# Patient Record
Sex: FEMALE | Race: WHITE | NOT HISPANIC OR LATINO | Employment: FULL TIME | URBAN - METROPOLITAN AREA
[De-identification: names, ages, dates, MRNs, and addresses within clinical notes are randomized per-mention and may not be internally consistent; named-entity substitution may affect disease eponyms.]

---

## 2017-01-12 ENCOUNTER — OFFICE VISIT (OUTPATIENT)
Dept: OBGYN | Facility: CLINIC | Age: 47
End: 2017-01-12
Payer: COMMERCIAL

## 2017-01-12 VITALS — DIASTOLIC BLOOD PRESSURE: 77 MMHG | HEART RATE: 91 BPM | SYSTOLIC BLOOD PRESSURE: 126 MMHG

## 2017-01-12 DIAGNOSIS — N92.6 IRREGULAR MENSES: Primary | ICD-10-CM

## 2017-01-12 LAB — BETA HCG QUAL IFA URINE: NEGATIVE

## 2017-01-12 PROCEDURE — 58100 BIOPSY OF UTERUS LINING: CPT | Performed by: OBSTETRICS & GYNECOLOGY

## 2017-01-12 PROCEDURE — 84703 CHORIONIC GONADOTROPIN ASSAY: CPT | Performed by: OBSTETRICS & GYNECOLOGY

## 2017-01-12 PROCEDURE — 88305 TISSUE EXAM BY PATHOLOGIST: CPT | Performed by: OBSTETRICS & GYNECOLOGY

## 2017-01-12 NOTE — Clinical Note
GHAZALA--she has an appt with you next week, wants hysterectomy, would like minimally invasive if possible.  I did endo biopsy today, has had ultrasound.  Thanks.   Julia

## 2017-01-12 NOTE — NURSING NOTE
"Chief Complaint   Patient presents with     Other     emb       Initial LMP 2016 Estimated body mass index is 34.37 kg/(m^2) as calculated from the following:    Height as of 10/10/16: 5' 8\" (1.727 m).    Weight as of 16: 226 lb (102.513 kg).  BP completed using cuff size: regular        The following HM Due: NONE      The following patient reported/Care Every where data was sent to:  P ABSTRACT QUALITY INITIATIVES [[id number]]  none     n/a               "

## 2017-01-12 NOTE — PROGRESS NOTES
Coco Wu is a 46 year old  who presents for endometrial biopsy.  See prior GYN note, ultrasound.  She has taken valium/percocet/ibuprofent.     OBJECTIVE:  /77 mmHg  Pulse 91  LMP 2016     Informed consent was obtained for endometrial biopsy.  Using standard technique and hibiclens prep of the cervix and vagina, pipelle aspiration of the endometrium was obtained.  The cervix was grasped anteriorly with a tenaculum.  The uterus sounded to 8 cm.  Tissue was obtained, placed in a container with formalin, labeled, and submitted to pathology.    The patient tolerated the procedure well.  Blood loss was less than one cc.  Standard post-biopsy precautions were given.    ASSESSMENT:  Menorrhagia.  Fibroids.  Desire for definitive treatment.      PLAN:  Call for biopsy results in 1 week.  She has an appt next week with Dr. Wolf to discuss hysterectomy, possibility of laparoscopic vs vaginal.

## 2017-01-16 LAB — COPATH REPORT: NORMAL

## 2017-01-18 ENCOUNTER — OFFICE VISIT (OUTPATIENT)
Dept: OBGYN | Facility: CLINIC | Age: 47
End: 2017-01-18
Payer: COMMERCIAL

## 2017-01-18 VITALS
BODY MASS INDEX: 33.63 KG/M2 | WEIGHT: 221.9 LBS | TEMPERATURE: 97.3 F | DIASTOLIC BLOOD PRESSURE: 73 MMHG | HEART RATE: 73 BPM | SYSTOLIC BLOOD PRESSURE: 118 MMHG | HEIGHT: 68 IN

## 2017-01-18 DIAGNOSIS — N92.0 EXCESSIVE OR FREQUENT MENSTRUATION: ICD-10-CM

## 2017-01-18 DIAGNOSIS — D25.1 INTRAMURAL LEIOMYOMA OF UTERUS: Primary | ICD-10-CM

## 2017-01-18 PROCEDURE — 99214 OFFICE O/P EST MOD 30 MIN: CPT | Performed by: OBSTETRICS & GYNECOLOGY

## 2017-01-18 NOTE — PROGRESS NOTES
"S; Coco Wu is a 46 year old  who is here to discuss hysterectomy.  She has seen Dr. Mckeon for her symptomatic fibroids and menorrhagia and was referred to me because she desires laparoscopic hysterectomy if appropriate.  She also desires to keep her cervix if appropriate.  She is otherwise without complaints.  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    O: /73 mmHg  Pulse 73  Temp(Src) 97.3  F (36.3  C) (Oral)  Ht 5' 8\" (1.727 m)  Wt 221 lb 14.4 oz (100.653 kg)  BMI 33.75 kg/m2  LMP 2017 (Exact Date)    Gen: NAD    No further exam done    A/P: menorrhagia and uterine fibroids   We had a lengthy discussion about hysterectomy options including vaginal vs laparoscopic.  We also reviewed the pros and cons of removing the cervix.  She feels fairly strongly that unless there is a compelling reason to take the cervix, she prefers it is left in.  I explained she will need continued pap smears and has the small possibility of light bleeding during menses if endometrial cells are left behind, and she is ok with that.  We discussed mocellation of the uterus and risk of potential spread of sarcoma if it were to be present.  We discussed the very low risk of sarcoma in general and she is ok with that risk as well.  So, we will plan for a single port laparoscopic supracervical hyst.  We will plan to place in bag and hand morcellate.  We also discussed plan to leave ovaries, unless safety issues arise, and remove the fallopian tubes.   We discussed the risk of surgery including, but not limited to, risk of anesthesia, bleeding, infection, injury to abdominal or pelvic organs, and risk of injury requiring second surgery. She gave verbal consent and all questions were answered.    AMERICA RICHARDSON MD    This visit lasted approximately 25 minutes and " >50% of the time was spent on counseling about hysterectomy.

## 2017-01-18 NOTE — Clinical Note
Surgeon: Armida Wolf Assistant: yes Michelle Evans MD or Lyn Mckinney MD (avani is ok too if necessary) Procedure: single port laparoscopic supracervical hysterectomy Diagnosis: uterine fibroids Length of surgery: 3hr Morning admit: No Day/Time Preference: asap Anesthesia: General Pre-op: With Primary Latex Allergy: No Want pre op labs done: Yes

## 2017-01-18 NOTE — NURSING NOTE
"Chief Complaint   Patient presents with     Consult     hysterectomy consult       Initial /73 mmHg  Pulse 73  Temp(Src) 97.3  F (36.3  C) (Oral)  Ht 5' 8\" (1.727 m)  Wt 221 lb 14.4 oz (100.653 kg)  BMI 33.75 kg/m2  LMP 2017 (Exact Date) Estimated body mass index is 33.75 kg/(m^2) as calculated from the following:    Height as of this encounter: 5' 8\" (1.727 m).    Weight as of this encounter: 221 lb 14.4 oz (100.653 kg).  BP completed using cuff size: regular        The following HM Due: NONE      The following patient reported/Care Every where data was sent to:  P ABSTRACT QUALITY INITIATIVES [[id number]]       patient has appointment for today  Mary Noguera                 "

## 2017-01-20 ENCOUNTER — MYC MEDICAL ADVICE (OUTPATIENT)
Dept: FAMILY MEDICINE | Facility: CLINIC | Age: 47
End: 2017-01-20

## 2017-01-23 ENCOUNTER — OFFICE VISIT (OUTPATIENT)
Dept: FAMILY MEDICINE | Facility: CLINIC | Age: 47
End: 2017-01-23
Payer: COMMERCIAL

## 2017-01-23 VITALS
RESPIRATION RATE: 16 BRPM | TEMPERATURE: 97.7 F | BODY MASS INDEX: 33.46 KG/M2 | OXYGEN SATURATION: 99 % | SYSTOLIC BLOOD PRESSURE: 104 MMHG | WEIGHT: 220 LBS | HEART RATE: 72 BPM | DIASTOLIC BLOOD PRESSURE: 70 MMHG

## 2017-01-23 DIAGNOSIS — N92.1 MENOMETRORRHAGIA: ICD-10-CM

## 2017-01-23 DIAGNOSIS — Z01.818 PREOP GENERAL PHYSICAL EXAM: Primary | ICD-10-CM

## 2017-01-23 DIAGNOSIS — D25.1 INTRAMURAL LEIOMYOMA OF UTERUS: ICD-10-CM

## 2017-01-23 DIAGNOSIS — K21.9 GASTROESOPHAGEAL REFLUX DISEASE WITHOUT ESOPHAGITIS: ICD-10-CM

## 2017-01-23 LAB — HGB BLD-MCNC: 13 G/DL (ref 11.7–15.7)

## 2017-01-23 PROCEDURE — 85018 HEMOGLOBIN: CPT | Performed by: FAMILY MEDICINE

## 2017-01-23 PROCEDURE — 99214 OFFICE O/P EST MOD 30 MIN: CPT | Performed by: FAMILY MEDICINE

## 2017-01-23 PROCEDURE — 36415 COLL VENOUS BLD VENIPUNCTURE: CPT | Performed by: FAMILY MEDICINE

## 2017-01-23 RX ORDER — LORATADINE 10 MG/1
10 TABLET ORAL DAILY
COMMUNITY
Start: 2017-01-23 | End: 2021-03-02

## 2017-01-23 RX ORDER — PANAX, AMERICAN GINSG/B12/ROYL 150-25-25
1 CAPSULE ORAL DAILY
COMMUNITY
Start: 2017-01-23

## 2017-01-23 NOTE — NURSING NOTE
"Chief Complaint   Patient presents with     Pre-Op Exam       Initial /70 mmHg  Pulse 72  Temp(Src) 97.7  F (36.5  C) (Oral)  Resp 16  Wt 220 lb (99.791 kg)  SpO2 99%  LMP 01/16/2017 (Exact Date) Estimated body mass index is 33.46 kg/(m^2) as calculated from the following:    Height as of 1/18/17: 5' 8\" (1.727 m).    Weight as of this encounter: 220 lb (99.791 kg).  BP completed using cuff size: travis Dejesus CMA      "

## 2017-01-23 NOTE — PROGRESS NOTES
[facility] [id number] [place] [id number] [phone]  Dept: [phone]    PRE-OP EVALUATION:  Today's date: 2017    Coco Wu (: 1970) presents for pre-operative evaluation assessment as requested by Armida Lopez MD, Assisting / Michelle Evans.  She requires evaluation and anesthesia risk assessment prior to undergoing surgery/procedure for treatment of menorrhagia/uterine fibroids.  Proposed procedure: Laparoscopic Single Port Supracervical Hysterectomy, Bilateral Laparoscopic Salpingectomy    Date of Surgery/ Procedure: 2017  Time of Surgery/ Procedure: 7:30 AM  Hospital/Surgical Facility: [facility]    Primary Physician: Poonam Mix  Type of Anesthesia Anticipated: General    Patient has a Health Care Directive or Living Will:  NO    Preop Questions 2017   1.  Do you have a history of heart attack, stroke, stent, bypass or surgery on an artery in the head, neck, heart or legs? No   2.  Do you ever have any pain or discomfort in your chest? No   3.  Do you have a history of  Heart Failure? No   4.   Are you troubled by shortness of breath when:  walking on a level surface, or up a slight hill, or at night? No   5.  Do you currently have a cold, bronchitis or other respiratory infection? No   6.  Do you have a cough, shortness of breath, or wheezing? No   7.  Do you sometimes get pains in the calves of your legs when you walk? No   8. Do you or anyone in your family have previous history of blood clots? No   9.  Do you or does anyone in your family have a serious bleeding problem such as prolonged bleeding following surgeries or cuts? No   10. Have you ever had problems with anemia or been told to take iron pills? No   11. Have you had any abnormal blood loss such as black, tarry or bloody stools, or abnormal vaginal bleeding? YES - self   12. Have you ever had a blood transfusion? No   13. Have you or any of your relatives  ever had problems with anesthesia? No   14. Do you have sleep apnea, excessive snoring or daytime drowsiness? No   15. Do you have any prosthetic heart valves? No   16. Do you have prosthetic joints? No   17. Is there any chance that you may be pregnant? No           HPI:                                                      Brief HPI related to upcoming procedure: increasingly irregular and heavy periods.  US showed fibroids, one abutting the endometrium.        See problem list for active medical problems.  Problems all longstanding and stable, except as noted/documented.  See ROS for pertinent symptoms related to these conditions.          GERD Follow-Up:  Well-controlled with diet.      MEDICAL HISTORY:                                                      Patient Active Problem List    Diagnosis Date Noted     Excessive or frequent menstruation 01/18/2017     Priority: Medium     Intramural leiomyoma of uterus 01/18/2017     Priority: Medium     Gastroesophageal reflux disease without esophagitis 09/10/2015     Priority: Medium     Allergic rhinitis due to allergen      Priority: Medium      Past Medical History   Diagnosis Date     Other nephritis and nephropathy, not specified as acute or chronic, with specified pathological lesion in kidney age 6 years     unsure - recalls kidney biopsy     Obesity      resolved - lost 72#     Allergic rhinitis due to allergen      ASCUS favor benign 2010     neg HPV  Plan cotest in 3 yrs     Past Surgical History   Procedure Laterality Date     Phlebectomy multiple stab  8/1/2012     Procedure: PHLEBECTOMY MULTIPLE STAB;  Left phlebectomy;  Surgeon: Lyndon Tenorio MD;  Location: [facility] OR     Current Outpatient Prescriptions   Medication Sig Dispense Refill     Ginsengs-Royal Jelly-Vit B12 (GINSENG ROYAL JELLY PLUS) 375-1-15 MG-MG-MCG CAPS Take 1 capsule by mouth daily       magnesium oxide (MAG-OX) 400 (241.3 MG) MG tablet Take 1 tablet (400 mg) by mouth daily        loratadine (CLARITIN) 10 MG tablet Take 1 tablet (10 mg) by mouth daily       OTC products: NSAIDS prn - has plan to stop 10 days prior to surgery    Allergies   Allergen Reactions     Bactrim      Pt had rash all over her body.     Tylenol W/Codeine [Acetaminophen-Codeine] Nausea      Latex Allergy: NO    Social History   Substance Use Topics     Smoking status: Never Smoker      Smokeless tobacco: Never Used     Alcohol Use: Yes      Comment: 3 glasses wine a week     History   Drug Use No       REVIEW OF SYSTEMS:                                                    CONST: NEGATIVE for fevers/chills/sweats, unexplained weight loss/gain, and POSITIVE for fatigue  EYES: NEGATIVE for change in vision  ENT: NEGATIVE for difficulty hearing/tinnitus, and problems with teeth/gums  BREAST: NEGATIVE for breast lump/discharge  CV: NEGATIVE for chest pain/discomfort, leg pain with exercise, and palpitations  RESP: NEGATIVE for cough/wheeze, and difficulty breathing  GI: NEGATIVE for abdominal pain, blood in bowel movement, and nausea/vomiting/diarrhea  : NEGATIVE for nighttime urination, leaking urine, vaginal discharge, and concerns about sexual function  MS: POSITIVE for chronic hip pain  SKIN: NEGATIVE for rash or mole change  NEURO: NEGATIVE for headache, dizziness/lightheadedness, numbness, memory loss, and loss of coordination  PSYCH: NEGATIVE for anxiety/stress, problems with sleep, and depression  HEME: NEGATIVE for unexplained lumps, and easy bruising/bleeding  ENDO: NEGATIVE for excessive thirst or urination  ALL: POSITIVE for allergies     EXAM:                                                    /70 mmHg  Pulse 72  Temp(Src) 97.7  F (36.5  C) (Oral)  Resp 16  Wt 220 lb (99.791 kg)  SpO2 99%  LMP 01/16/2017 (Exact Date)  GEN:  no apparent distress  EYES: PERRL, conjunctivae and sclerae clear   ENT: external ears and nose without lesions or scars, TM's and canals clear bilaterally, oropharynx clear with  moist mucus membranes and normal landmarks and lips, teeth, and gums with no abnormalities noted   NECK:  Supple without adenopathy, mass, or thyromegaly   LUNGS:  normal respiratory effort, and lungs clear to auscultation bilaterally - no rales, rhonchi or wheezes  CV: regular rate and rhythm, normal S1 S2, no S3 or S4, no murmur, click or rub and bilateral lower extremities without edema   ABD:  soft, nontender, no mass, no hepatosplenomegaly, no hernias  SKIN:  normal to inspection and palpation, no rashes or abnormal-appearing lesions   PSYCH: mood/affect appear normal/bright     DIAGNOSTICS:                                                      Labs Resulted Today:   Results for orders placed or performed in visit on 01/23/17   Hemoglobin   Result Value Ref Range    Hemoglobin 13.0 11.7 - 15.7 g/dL       Recent Labs   Lab Test  10/10/16   1100  09/10/15   1118   HGB  12.8  13.8   PLT  241  240   NA   --   139   POTASSIUM   --   3.7   CR   --   0.88        IMPRESSION:                                                    Reason for surgery/procedure: menometrorrhagia with symptomatic fibroids  Diagnosis/reason for consult: preoperative assessment    The proposed surgical procedure is considered INTERMEDIATE risk.    REVISED CARDIAC RISK INDEX  The patient has the following serious cardiovascular risks for perioperative complications such as (MI, PE, VFib and 3  AV Block):  No serious cardiac risks  INTERPRETATION: 0 risks: Class I (very low risk - 0.4% complication rate)    The patient has the following additional risks for perioperative complications:  No identified additional risks      ICD-10-CM    1. Preop general physical exam Z01.818 Hemoglobin   2. Menometrorrhagia N92.1 Hemoglobin   3. Intramural leiomyoma of uterus D25.1    4. Gastroesophageal reflux disease without esophagitis K21.9        RECOMMENDATIONS:                                                        --Stop NSAIDs one week prior to  surgery.  --Patient is to hold all medications/supplements on the day of surgery.    APPROVAL GIVEN to proceed with proposed procedure, without further diagnostic evaluation       Signed Electronically by: Poonam Mix MD    Copy of this evaluation report is provided to requesting physician.    [facility] Preop Guidelines

## 2017-01-23 NOTE — MR AVS SNAPSHOT
After Visit Summary   1/23/2017    Coco Wu    MRN: [id number]           Patient Information     Date Of Birth          1970        Visit Information        Provider Department      1/23/2017 2:00 PM Poonam Mix MD [facility]        Today's Diagnoses     Preop general physical exam    -  1     Excessive or frequent menstruation         Intramural leiomyoma of uterus         Gastroesophageal reflux disease without esophagitis           Care Instructions      Before Your Surgery      Call your surgeon if there is any change in your health. This includes signs of a cold or flu (such as a sore throat, runny nose, cough, rash or fever).    Do not smoke, drink alcohol or take over the counter medicine (unless your surgeon or primary care doctor tells you to) for the 24 hours before and after surgery.    If you take prescribed drugs: Follow your doctor s orders about which medicines to take and which to stop until after surgery.    Eating and drinking prior to surgery: follow the instructions from your surgeon    Take a shower or bath the night before surgery. Use the soap your surgeon gave you to gently clean your skin. If you do not have soap from your surgeon, use your regular soap. Do not shave or scrub the surgery site.  Wear clean pajamas and have clean sheets on your bed.   Before Your Surgery    Call your surgeon if there is any change in your health. This includes signs of a cold or flu (such as a sore throat, runny nose, cough, rash or fever).  Do not smoke, drink alcohol or take over the counter medicine (unless your surgeon or primary care doctor tells you to) for the 24 hours before and after surgery.  If you take prescribed drugs: Follow your doctor s orders about which medicines to take and which to stop until after surgery.  Eating and drinking prior to surgery: follow the instructions from your surgeon  Take a shower or bath the night before surgery. Use the  soap your surgeon gave you to gently clean your skin. If you do not have soap from your surgeon, use your regular soap. Do not shave or scrub the surgery site.  Wear clean pajamas and have clean sheets on your bed.         Follow-ups after your visit        Your next 10 appointments already scheduled     Feb 15, 2017  4:00 PM   LAB with RD LAB   [facility]    [address] [place] [id number] [phone]           Patient must bring picture ID.  Patient should be prepared to give a urine specimen  Please do not eat 10-12 hours before your appointment if you are coming in fasting for labs on lipids, cholesterol, or glucose (sugar).  Pregnant women should follow their Care Team instructions. Water with medications is okay. Do not drink coffee or other fluids.   If you have concerns about taking  your medications, please ask at office or if scheduling via [employer], send a message by clicking on Secure Messaging, Message Your Care Team.            Feb 17, 2017   Procedure with Armida Wolf MD   [facility], [place], Same Day Surgery (--)    [address] [facility] [address] [phone]              Who to contact     If you have questions or need follow up information about today's clinic visit or your schedule please contact [facility] directly at [phone].  Normal or non-critical lab and imaging results will be communicated to you by MyChart, letter or phone within 4 business days after the clinic has received the results. If you do not hear from us within 7 days, please contact the clinic through MyChart or phone. If you have a critical or abnormal lab result, we will notify you by phone as soon as possible.  Submit refill requests through [employer] or call your pharmacy and they will forward the refill request to us. Please allow 3 business days for your refill to be completed.          Additional Information About Your  Visit        MyChar Information     [employer] gives you secure access to your electronic health record. If you see a primary care provider, you can also send messages to your care team and make appointments. If you have questions, please call your primary care clinic.  If you do not have a primary care provider, please call [phone] and they will assist you.        Care EveryWhere ID     This is your Care EveryWhere ID. This could be used by other organizations to access your [facility] medical records  [id number]        Your Vitals Were     Pulse Temperature Respirations Pulse Oximetry Last Period       72 97.7  F (36.5  C) (Oral) 16 99% 01/16/2017 (Exact Date)        Blood Pressure from Last 3 Encounters:   01/23/17 104/70   01/18/17 118/73   01/12/17 126/77    Weight from Last 3 Encounters:   01/23/17 220 lb (99.791 kg)   01/18/17 221 lb 14.4 oz (100.653 kg)   12/20/16 226 lb (102.513 kg)              We Performed the Following     Hemoglobin        Primary Care Provider Office Phone # Fax #    Poonam Mix [phone]       [facility] [address]  [facility] [address]        Thank you!     Thank you for choosing [facility]  for your care. Our goal is always to provide you with excellent care. Hearing back from our patients is one way we can continue to improve our services. Please take a few minutes to complete the written survey that you may receive in the mail after your visit with us. Thank you!             Your Updated Medication List - Protect others around you: Learn how to safely use, store and throw away your medicines at www.disposemymeds.org.          This list is accurate as of: 1/23/17  2:30 PM.  Always use your most recent med list.                   Brand Name Dispense Instructions for use    CLARITIN 10 MG tablet   Generic drug:  loratadine      Take 1 tablet (10 mg) by mouth daily       GINSENG ROYAL JELLY PLUS 375-1-15 MG-MG-MCG Caps       Take 1 capsule by mouth daily       magnesium oxide 400 (241.3 MG) MG tablet    MAG-OX     Take 1 tablet (400 mg) by mouth daily

## 2017-01-23 NOTE — PROGRESS NOTES
"[facility] [place]  [address] [place] [id number] [phone]  Dept: [phone]    PRE-OP EVALUATION:  Today's date: 2017    Coco Wu (: 1970) presents for pre-operative evaluation assessment as requested by                    .  She requires evaluation and anesthesia risk assessment prior to undergoing surgery/procedure for treatment of Laparoscopic Single Port Supracervical Hysterectomy, Bilateral Laparoscopic Salpingectomy .  Proposed procedure: Laparoscopic Single Port Supracervical Hysterectomy, Bilateral Laparoscopic Salpingectomy    Date of Surgery/ Procedure: ***  Time of Surgery/ Procedure: ***  Hospital/Surgical Facility: ***  {SURGERY FAX NUMBER:[id number]::\"Fax number for surgical facility: ***\"}  Primary Physician: Poonam Mix  Type of Anesthesia Anticipated: {ANESTHESIA:698428}    Patient has a Health Care Directive or Living Will:  {YES/NO:973528::\"NO\"}    {PREOP QUESTIONNAIRE OPTIONS 2 (by MA):947522}    HPI:                                                      Brief HPI related to upcoming procedure: ***      {Ch. Problems:160837}    MEDICAL HISTORY:                                                      Patient Active Problem List    Diagnosis Date Noted     Excessive or frequent menstruation 2017     Priority: Medium     Intramural leiomyoma of uterus 2017     Priority: Medium     Gastroesophageal reflux disease without esophagitis 09/10/2015     Priority: Medium     Allergic rhinitis due to allergen      Priority: Medium     CARDIOVASCULAR SCREENING; LDL GOAL LESS THAN 160 2010     Priority: Medium      Past Medical History   Diagnosis Date     Other nephritis and nephropathy, not specified as acute or chronic, with specified pathological lesion in kidney age 6 years     unsure - recalls kidney biopsy     Obesity      resolved - lost 72#     Allergic rhinitis due to allergen      ASCUS favor benign      neg HPV  Plan " "cotest in 3 yrs     Past Surgical History   Procedure Laterality Date     Phlebectomy multiple stab  8/1/2012     Procedure: PHLEBECTOMY MULTIPLE STAB;  Left phlebectomy;  Surgeon: Lyndon Tenorio MD;  Location: [facility] OR     Current Outpatient Prescriptions   Medication Sig Dispense Refill     Ginsengs-Royal Jelly-Vit B12 (GINSENG ROYAL JELLY PLUS) 375-1-15 MG-MG-MCG CAPS Take 1 capsule by mouth daily       magnesium oxide (MAG-OX) 400 (241.3 MG) MG tablet Take 1 tablet (400 mg) by mouth daily       loratadine (CLARITIN) 10 MG tablet Take 1 tablet (10 mg) by mouth daily       OTC products: {OTC ANALGESICS:341624}    Allergies   Allergen Reactions     Bactrim      Pt had rash all over her body.     Tylenol W/Codeine [Acetaminophen-Codeine] Nausea      Latex Allergy: {YES/NO WITH DEFAULT:611157::\"NO\"}    Social History   Substance Use Topics     Smoking status: Never Smoker      Smokeless tobacco: Never Used     Alcohol Use: Yes      Comment: once a wk.     History   Drug Use No       REVIEW OF SYSTEMS:                                                    {ROS Preop Choices:404928}    EXAM:                                                    LMP 01/16/2017 (Exact Date)  {EXAM Preop Choices:961363}    DIAGNOSTICS:                                                    {DIAGNOSTIC FOR PREOP:635594}    Recent Labs   Lab Test  10/10/16   1100  09/10/15   1118   HGB  12.8  13.8   PLT  241  240   NA   --   139   POTASSIUM   --   3.7   CR   --   0.88        IMPRESSION:                                                    {PREOP REASONS:572128::\"Reason for surgery/procedure: ***\",\"Diagnosis/reason for consult: ***\"}    The proposed surgical procedure is considered {HIGH=major cardiovascular or procedures requiring prolonged anesthesia >4 hours or large fluid shifts;    INTERMEDIATE=abdominal, most orthopedic and intrathoracic surgery; LOW= endoscopy, cataract and breast surgery:441175} risk.    REVISED CARDIAC RISK INDEX  The " "patient has the following serious cardiovascular risks for perioperative complications such as (MI, PE, VFib and 3  AV Block):  {PREOP REVISED CARDIAC INDEX (RCI):854982:p:\"No serious cardiac risks\"}  INTERPRETATION: {REVISED CARDIAC RISK INTERPRETATION:814716}    The patient has the following additional risks for perioperative complications:  {Additional perioperative risks:535145:p:\"No identified additional risks\"}    No diagnosis found.    RECOMMENDATIONS:                                                      {IMPORTANT - Conditions - complete carefully!!:663647}    {IMPORTANT - Medications:551609::\"--Patient is to take all scheduled medications on the day of surgery EXCEPT for modifications listed below.\"}    {IMPORTANT - Approval:953043:p:\"APPROVAL GIVEN to proceed with proposed procedure, without further diagnostic evaluation\"}       Signed Electronically by: Poonam Mix MD    Copy of this evaluation report is provided to requesting physician.    [facility] Preop Guidelines  "

## 2017-02-10 DIAGNOSIS — Z01.818 PREOPERATIVE EXAMINATION: Primary | ICD-10-CM

## 2017-02-11 ENCOUNTER — ANESTHESIA EVENT (OUTPATIENT)
Dept: SURGERY | Facility: CLINIC | Age: 47
End: 2017-02-11
Payer: COMMERCIAL

## 2017-02-15 DIAGNOSIS — Z01.818 PREOPERATIVE EXAMINATION: ICD-10-CM

## 2017-02-15 LAB
ABO + RH BLD: NORMAL
ABO + RH BLD: NORMAL
BLD GP AB SCN SERPL QL: NORMAL
BLOOD BANK CMNT PATIENT-IMP: NORMAL
ERYTHROCYTE [DISTWIDTH] IN BLOOD BY AUTOMATED COUNT: 14.8 % (ref 10–15)
HCT VFR BLD AUTO: 35.8 % (ref 35–47)
HGB BLD-MCNC: 11.7 G/DL (ref 11.7–15.7)
MCH RBC QN AUTO: 29.5 PG (ref 26.5–33)
MCHC RBC AUTO-ENTMCNC: 32.7 G/DL (ref 31.5–36.5)
MCV RBC AUTO: 90 FL (ref 78–100)
PLATELET # BLD AUTO: 262 10E9/L (ref 150–450)
RBC # BLD AUTO: 3.96 10E12/L (ref 3.8–5.2)
SPECIMEN EXP DATE BLD: NORMAL
WBC # BLD AUTO: 6.9 10E9/L (ref 4–11)

## 2017-02-15 PROCEDURE — 36415 COLL VENOUS BLD VENIPUNCTURE: CPT | Performed by: OBSTETRICS & GYNECOLOGY

## 2017-02-15 PROCEDURE — 86850 RBC ANTIBODY SCREEN: CPT | Performed by: OBSTETRICS & GYNECOLOGY

## 2017-02-15 PROCEDURE — 85027 COMPLETE CBC AUTOMATED: CPT | Performed by: OBSTETRICS & GYNECOLOGY

## 2017-02-15 PROCEDURE — 86900 BLOOD TYPING SEROLOGIC ABO: CPT | Performed by: OBSTETRICS & GYNECOLOGY

## 2017-02-15 PROCEDURE — 86901 BLOOD TYPING SEROLOGIC RH(D): CPT | Performed by: OBSTETRICS & GYNECOLOGY

## 2017-02-15 PROCEDURE — 99207 ZZC NO CHARGE NURSE ONLY: CPT | Performed by: OBSTETRICS & GYNECOLOGY

## 2017-02-17 ENCOUNTER — SURGERY (OUTPATIENT)
Age: 47
End: 2017-02-17

## 2017-02-17 ENCOUNTER — HOSPITAL ENCOUNTER (OUTPATIENT)
Facility: CLINIC | Age: 47
Discharge: HOME | End: 2017-02-17
Attending: OBSTETRICS & GYNECOLOGY | Admitting: OBSTETRICS & GYNECOLOGY
Payer: COMMERCIAL

## 2017-02-17 ENCOUNTER — ANESTHESIA (OUTPATIENT)
Dept: SURGERY | Facility: CLINIC | Age: 47
End: 2017-02-17
Payer: COMMERCIAL

## 2017-02-17 VITALS
BODY MASS INDEX: 32.81 KG/M2 | OXYGEN SATURATION: 97 % | WEIGHT: 216.49 LBS | SYSTOLIC BLOOD PRESSURE: 114 MMHG | HEIGHT: 68 IN | DIASTOLIC BLOOD PRESSURE: 67 MMHG | RESPIRATION RATE: 14 BRPM | TEMPERATURE: 97.7 F

## 2017-02-17 DIAGNOSIS — Z90.710 S/P HYSTERECTOMY: Primary | ICD-10-CM

## 2017-02-17 LAB
HCG UR QL: NEGATIVE
HGB BLD-MCNC: 13.8 G/DL (ref 11.7–15.7)

## 2017-02-17 PROCEDURE — 36000064 ZZH SURGERY LEVEL 4 EA 15 ADDTL MIN - UMMC: Performed by: OBSTETRICS & GYNECOLOGY

## 2017-02-17 PROCEDURE — 81025 URINE PREGNANCY TEST: CPT | Performed by: OBSTETRICS & GYNECOLOGY

## 2017-02-17 PROCEDURE — C1765 ADHESION BARRIER: HCPCS | Performed by: OBSTETRICS & GYNECOLOGY

## 2017-02-17 PROCEDURE — 25000566 ZZH SEVOFLURANE, EA 15 MIN: Performed by: OBSTETRICS & GYNECOLOGY

## 2017-02-17 PROCEDURE — 25000128 H RX IP 250 OP 636: Performed by: NURSE ANESTHETIST, CERTIFIED REGISTERED

## 2017-02-17 PROCEDURE — 85018 HEMOGLOBIN: CPT | Performed by: ANESTHESIOLOGY

## 2017-02-17 PROCEDURE — 71000014 ZZH RECOVERY PHASE 1 LEVEL 2 FIRST HR: Performed by: OBSTETRICS & GYNECOLOGY

## 2017-02-17 PROCEDURE — 27210995 ZZH RX 272: Performed by: OBSTETRICS & GYNECOLOGY

## 2017-02-17 PROCEDURE — 25000125 ZZHC RX 250: Performed by: ANESTHESIOLOGY

## 2017-02-17 PROCEDURE — 58542 LSH W/T/O UT 250 G OR LESS: CPT | Mod: GC | Performed by: OBSTETRICS & GYNECOLOGY

## 2017-02-17 PROCEDURE — 25000128 H RX IP 250 OP 636: Performed by: ANESTHESIOLOGY

## 2017-02-17 PROCEDURE — 88307 TISSUE EXAM BY PATHOLOGIST: CPT | Performed by: OBSTETRICS & GYNECOLOGY

## 2017-02-17 PROCEDURE — 71000027 ZZH RECOVERY PHASE 2 EACH 15 MINS: Performed by: OBSTETRICS & GYNECOLOGY

## 2017-02-17 PROCEDURE — 37000008 ZZH ANESTHESIA TECHNICAL FEE, 1ST 30 MIN: Performed by: OBSTETRICS & GYNECOLOGY

## 2017-02-17 PROCEDURE — 25000125 ZZHC RX 250: Performed by: NURSE ANESTHETIST, CERTIFIED REGISTERED

## 2017-02-17 PROCEDURE — 27210794 ZZH OR GENERAL SUPPLY STERILE: Performed by: OBSTETRICS & GYNECOLOGY

## 2017-02-17 PROCEDURE — 36000062 ZZH SURGERY LEVEL 4 1ST 30 MIN - UMMC: Performed by: OBSTETRICS & GYNECOLOGY

## 2017-02-17 PROCEDURE — C9290 INJ, BUPIVACAINE LIPOSOME: HCPCS | Performed by: ANESTHESIOLOGY

## 2017-02-17 PROCEDURE — 37000009 ZZH ANESTHESIA TECHNICAL FEE, EACH ADDTL 15 MIN: Performed by: OBSTETRICS & GYNECOLOGY

## 2017-02-17 PROCEDURE — 25800025 ZZH RX 258: Performed by: NURSE ANESTHETIST, CERTIFIED REGISTERED

## 2017-02-17 PROCEDURE — 40000171 ZZH STATISTIC PRE-PROCEDURE ASSESSMENT III: Performed by: OBSTETRICS & GYNECOLOGY

## 2017-02-17 PROCEDURE — 71000015 ZZH RECOVERY PHASE 1 LEVEL 2 EA ADDTL HR: Performed by: OBSTETRICS & GYNECOLOGY

## 2017-02-17 PROCEDURE — 25000125 ZZHC RX 250: Performed by: OBSTETRICS & GYNECOLOGY

## 2017-02-17 PROCEDURE — 58542 LSH W/T/O UT 250 G OR LESS: CPT | Mod: 80 | Performed by: OBSTETRICS & GYNECOLOGY

## 2017-02-17 PROCEDURE — 88307 TISSUE EXAM BY PATHOLOGIST: CPT | Mod: 26 | Performed by: OBSTETRICS & GYNECOLOGY

## 2017-02-17 PROCEDURE — 25000128 H RX IP 250 OP 636: Performed by: OBSTETRICS & GYNECOLOGY

## 2017-02-17 PROCEDURE — 27211024 ZZHC OR SUPPLY OTHER OPNP: Performed by: OBSTETRICS & GYNECOLOGY

## 2017-02-17 RX ORDER — ONDANSETRON 4 MG/1
4 TABLET, ORALLY DISINTEGRATING ORAL EVERY 30 MIN PRN
Status: DISCONTINUED | OUTPATIENT
Start: 2017-02-17 | End: 2017-02-17 | Stop reason: HOSPADM

## 2017-02-17 RX ORDER — ONDANSETRON 2 MG/ML
4 INJECTION INTRAMUSCULAR; INTRAVENOUS EVERY 30 MIN PRN
Status: DISCONTINUED | OUTPATIENT
Start: 2017-02-17 | End: 2017-02-17 | Stop reason: HOSPADM

## 2017-02-17 RX ORDER — LIDOCAINE 40 MG/G
CREAM TOPICAL
Status: DISCONTINUED | OUTPATIENT
Start: 2017-02-17 | End: 2017-02-17 | Stop reason: HOSPADM

## 2017-02-17 RX ORDER — NALOXONE HYDROCHLORIDE 0.4 MG/ML
.1-.4 INJECTION, SOLUTION INTRAMUSCULAR; INTRAVENOUS; SUBCUTANEOUS
Status: DISCONTINUED | OUTPATIENT
Start: 2017-02-17 | End: 2017-02-17 | Stop reason: HOSPADM

## 2017-02-17 RX ORDER — SODIUM CHLORIDE, SODIUM LACTATE, POTASSIUM CHLORIDE, CALCIUM CHLORIDE 600; 310; 30; 20 MG/100ML; MG/100ML; MG/100ML; MG/100ML
INJECTION, SOLUTION INTRAVENOUS CONTINUOUS PRN
Status: DISCONTINUED | OUTPATIENT
Start: 2017-02-17 | End: 2017-02-17

## 2017-02-17 RX ORDER — AMOXICILLIN 250 MG
1-2 CAPSULE ORAL 2 TIMES DAILY
Qty: 30 TABLET | Refills: 0 | Status: SHIPPED | OUTPATIENT
Start: 2017-02-17 | End: 2017-03-03

## 2017-02-17 RX ORDER — FLUMAZENIL 0.1 MG/ML
0.2 INJECTION, SOLUTION INTRAVENOUS
Status: DISCONTINUED | OUTPATIENT
Start: 2017-02-17 | End: 2017-02-17 | Stop reason: HOSPADM

## 2017-02-17 RX ORDER — ALBUTEROL SULFATE 0.83 MG/ML
2.5 SOLUTION RESPIRATORY (INHALATION) EVERY 4 HOURS PRN
Status: DISCONTINUED | OUTPATIENT
Start: 2017-02-17 | End: 2017-02-17 | Stop reason: HOSPADM

## 2017-02-17 RX ORDER — KETOROLAC TROMETHAMINE 30 MG/ML
INJECTION, SOLUTION INTRAMUSCULAR; INTRAVENOUS PRN
Status: DISCONTINUED | OUTPATIENT
Start: 2017-02-17 | End: 2017-02-17

## 2017-02-17 RX ORDER — IBUPROFEN 600 MG/1
600 TABLET, FILM COATED ORAL EVERY 6 HOURS PRN
Qty: 30 TABLET | Refills: 0 | Status: SHIPPED | OUTPATIENT
Start: 2017-02-17 | End: 2017-03-03

## 2017-02-17 RX ORDER — KETOROLAC TROMETHAMINE 30 MG/ML
30 INJECTION, SOLUTION INTRAMUSCULAR; INTRAVENOUS EVERY 6 HOURS PRN
Status: DISCONTINUED | OUTPATIENT
Start: 2017-02-17 | End: 2017-02-17 | Stop reason: HOSPADM

## 2017-02-17 RX ORDER — CEFAZOLIN SODIUM 2 G/100ML
2 INJECTION, SOLUTION INTRAVENOUS
Status: COMPLETED | OUTPATIENT
Start: 2017-02-17 | End: 2017-02-17

## 2017-02-17 RX ORDER — DEXAMETHASONE SODIUM PHOSPHATE 4 MG/ML
INJECTION, SOLUTION INTRA-ARTICULAR; INTRALESIONAL; INTRAMUSCULAR; INTRAVENOUS; SOFT TISSUE PRN
Status: DISCONTINUED | OUTPATIENT
Start: 2017-02-17 | End: 2017-02-17

## 2017-02-17 RX ORDER — PROPOFOL 10 MG/ML
INJECTION, EMULSION INTRAVENOUS PRN
Status: DISCONTINUED | OUTPATIENT
Start: 2017-02-17 | End: 2017-02-17

## 2017-02-17 RX ORDER — GLYCOPYRROLATE 0.2 MG/ML
INJECTION, SOLUTION INTRAMUSCULAR; INTRAVENOUS PRN
Status: DISCONTINUED | OUTPATIENT
Start: 2017-02-17 | End: 2017-02-17

## 2017-02-17 RX ORDER — EPHEDRINE SULFATE 50 MG/ML
INJECTION, SOLUTION INTRAMUSCULAR; INTRAVENOUS; SUBCUTANEOUS PRN
Status: DISCONTINUED | OUTPATIENT
Start: 2017-02-17 | End: 2017-02-17

## 2017-02-17 RX ORDER — SODIUM CHLORIDE 9 MG/ML
INJECTION, SOLUTION INTRAVENOUS CONTINUOUS PRN
Status: DISCONTINUED | OUTPATIENT
Start: 2017-02-17 | End: 2017-02-17

## 2017-02-17 RX ORDER — MEPERIDINE HYDROCHLORIDE 25 MG/ML
12.5 INJECTION INTRAMUSCULAR; INTRAVENOUS; SUBCUTANEOUS
Status: DISCONTINUED | OUTPATIENT
Start: 2017-02-17 | End: 2017-02-17 | Stop reason: HOSPADM

## 2017-02-17 RX ORDER — MAGNESIUM HYDROXIDE 1200 MG/15ML
LIQUID ORAL PRN
Status: DISCONTINUED | OUTPATIENT
Start: 2017-02-17 | End: 2017-02-17 | Stop reason: HOSPADM

## 2017-02-17 RX ORDER — HYDROCODONE BITARTRATE AND ACETAMINOPHEN 5; 325 MG/1; MG/1
1-2 TABLET ORAL
Status: DISCONTINUED | OUTPATIENT
Start: 2017-02-17 | End: 2017-02-17 | Stop reason: HOSPADM

## 2017-02-17 RX ORDER — FENTANYL CITRATE 50 UG/ML
25-50 INJECTION, SOLUTION INTRAMUSCULAR; INTRAVENOUS
Status: DISCONTINUED | OUTPATIENT
Start: 2017-02-17 | End: 2017-02-17 | Stop reason: HOSPADM

## 2017-02-17 RX ORDER — HYDROCODONE BITARTRATE AND ACETAMINOPHEN 5; 325 MG/1; MG/1
1-2 TABLET ORAL EVERY 4 HOURS PRN
Qty: 20 TABLET | Refills: 0 | Status: SHIPPED | OUTPATIENT
Start: 2017-02-17 | End: 2017-03-03

## 2017-02-17 RX ORDER — CEFAZOLIN SODIUM 1 G/3ML
1 INJECTION, POWDER, FOR SOLUTION INTRAMUSCULAR; INTRAVENOUS SEE ADMIN INSTRUCTIONS
Status: DISCONTINUED | OUTPATIENT
Start: 2017-02-17 | End: 2017-02-17 | Stop reason: HOSPADM

## 2017-02-17 RX ORDER — FENTANYL CITRATE 50 UG/ML
INJECTION, SOLUTION INTRAMUSCULAR; INTRAVENOUS PRN
Status: DISCONTINUED | OUTPATIENT
Start: 2017-02-17 | End: 2017-02-17

## 2017-02-17 RX ORDER — BUPIVACAINE HYDROCHLORIDE AND EPINEPHRINE 2.5; 5 MG/ML; UG/ML
INJECTION, SOLUTION INFILTRATION; PERINEURAL PRN
Status: DISCONTINUED | OUTPATIENT
Start: 2017-02-17 | End: 2017-02-17

## 2017-02-17 RX ORDER — BUPIVACAINE HYDROCHLORIDE 2.5 MG/ML
INJECTION, SOLUTION INFILTRATION; PERINEURAL PRN
Status: DISCONTINUED | OUTPATIENT
Start: 2017-02-17 | End: 2017-02-17 | Stop reason: HOSPADM

## 2017-02-17 RX ORDER — SODIUM CHLORIDE, SODIUM LACTATE, POTASSIUM CHLORIDE, CALCIUM CHLORIDE 600; 310; 30; 20 MG/100ML; MG/100ML; MG/100ML; MG/100ML
INJECTION, SOLUTION INTRAVENOUS CONTINUOUS
Status: DISCONTINUED | OUTPATIENT
Start: 2017-02-17 | End: 2017-02-17 | Stop reason: HOSPADM

## 2017-02-17 RX ORDER — NEOSTIGMINE METHYLSULFATE 1 MG/ML
VIAL (ML) INJECTION PRN
Status: DISCONTINUED | OUTPATIENT
Start: 2017-02-17 | End: 2017-02-17

## 2017-02-17 RX ORDER — HYDROMORPHONE HYDROCHLORIDE 1 MG/ML
.3-.5 INJECTION, SOLUTION INTRAMUSCULAR; INTRAVENOUS; SUBCUTANEOUS EVERY 10 MIN PRN
Status: DISCONTINUED | OUTPATIENT
Start: 2017-02-17 | End: 2017-02-17 | Stop reason: HOSPADM

## 2017-02-17 RX ORDER — DEXAMETHASONE SODIUM PHOSPHATE 4 MG/ML
4 INJECTION, SOLUTION INTRA-ARTICULAR; INTRALESIONAL; INTRAMUSCULAR; INTRAVENOUS; SOFT TISSUE EVERY 10 MIN PRN
Status: DISCONTINUED | OUTPATIENT
Start: 2017-02-17 | End: 2017-02-17 | Stop reason: HOSPADM

## 2017-02-17 RX ORDER — LIDOCAINE HYDROCHLORIDE 20 MG/ML
INJECTION, SOLUTION INFILTRATION; PERINEURAL PRN
Status: DISCONTINUED | OUTPATIENT
Start: 2017-02-17 | End: 2017-02-17

## 2017-02-17 RX ORDER — ONDANSETRON 2 MG/ML
INJECTION INTRAMUSCULAR; INTRAVENOUS PRN
Status: DISCONTINUED | OUTPATIENT
Start: 2017-02-17 | End: 2017-02-17

## 2017-02-17 RX ADMIN — BUPIVACAINE HYDROCHLORIDE AND EPINEPHRINE BITARTRATE 20 ML: 2.5; .005 INJECTION, SOLUTION INFILTRATION; PERINEURAL at 07:25

## 2017-02-17 RX ADMIN — MIDAZOLAM 1 MG: 1 INJECTION INTRAMUSCULAR; INTRAVENOUS at 07:14

## 2017-02-17 RX ADMIN — LIDOCAINE HYDROCHLORIDE 100 MG: 20 INJECTION, SOLUTION INFILTRATION; PERINEURAL at 07:43

## 2017-02-17 RX ADMIN — Medication 20 MG: at 08:14

## 2017-02-17 RX ADMIN — GLYCOPYRROLATE 0.8 MG: 0.2 INJECTION, SOLUTION INTRAMUSCULAR; INTRAVENOUS at 09:46

## 2017-02-17 RX ADMIN — PROCHLORPERAZINE EDISYLATE 10 MG: 5 INJECTION INTRAMUSCULAR; INTRAVENOUS at 11:16

## 2017-02-17 RX ADMIN — BUPIVACAINE HYDROCHLORIDE 2 ML: 2.5 INJECTION, SOLUTION INFILTRATION; PERINEURAL at 08:39

## 2017-02-17 RX ADMIN — Medication 20 MG: at 09:05

## 2017-02-17 RX ADMIN — DEXAMETHASONE SODIUM PHOSPHATE 4 MG: 4 INJECTION, SOLUTION INTRAMUSCULAR; INTRAVENOUS at 07:55

## 2017-02-17 RX ADMIN — FENTANYL CITRATE 25 MCG: 50 INJECTION, SOLUTION INTRAMUSCULAR; INTRAVENOUS at 11:11

## 2017-02-17 RX ADMIN — FENTANYL CITRATE 100 MCG: 50 INJECTION, SOLUTION INTRAMUSCULAR; INTRAVENOUS at 08:35

## 2017-02-17 RX ADMIN — FENTANYL CITRATE 50 MCG: 50 INJECTION, SOLUTION INTRAMUSCULAR; INTRAVENOUS at 07:38

## 2017-02-17 RX ADMIN — CEFAZOLIN SODIUM 1 G: 2 INJECTION, SOLUTION INTRAVENOUS at 09:34

## 2017-02-17 RX ADMIN — SODIUM CHLORIDE: 9 INJECTION, SOLUTION INTRAVENOUS at 08:25

## 2017-02-17 RX ADMIN — MIDAZOLAM HYDROCHLORIDE 2 MG: 1 INJECTION, SOLUTION INTRAMUSCULAR; INTRAVENOUS at 07:38

## 2017-02-17 RX ADMIN — BUPIVACAINE 20 ML: 13.3 INJECTION, SUSPENSION, LIPOSOMAL INFILTRATION at 07:25

## 2017-02-17 RX ADMIN — FENTANYL CITRATE 100 MCG: 50 INJECTION, SOLUTION INTRAMUSCULAR; INTRAVENOUS at 08:21

## 2017-02-17 RX ADMIN — FENTANYL CITRATE 50 MCG: 50 INJECTION, SOLUTION INTRAMUSCULAR; INTRAVENOUS at 07:14

## 2017-02-17 RX ADMIN — SODIUM CHLORIDE: 9 INJECTION, SOLUTION INTRAVENOUS at 09:54

## 2017-02-17 RX ADMIN — GLYCOPYRROLATE 0.1 MG: 0.2 INJECTION, SOLUTION INTRAMUSCULAR; INTRAVENOUS at 08:02

## 2017-02-17 RX ADMIN — FENTANYL CITRATE 100 MCG: 50 INJECTION, SOLUTION INTRAMUSCULAR; INTRAVENOUS at 08:57

## 2017-02-17 RX ADMIN — NEOSTIGMINE METHYLSULFATE 5 MG: 1 INJECTION INTRAMUSCULAR; INTRAVENOUS; SUBCUTANEOUS at 09:46

## 2017-02-17 RX ADMIN — SODIUM CHLORIDE 300 ML: 900 IRRIGANT IRRIGATION at 09:56

## 2017-02-17 RX ADMIN — PROPOFOL 200 MG: 10 INJECTION, EMULSION INTRAVENOUS at 07:43

## 2017-02-17 RX ADMIN — Medication 40 MG: at 07:43

## 2017-02-17 RX ADMIN — SODIUM CHLORIDE: 9 INJECTION, SOLUTION INTRAVENOUS at 09:44

## 2017-02-17 RX ADMIN — Medication 5 MG: at 09:37

## 2017-02-17 RX ADMIN — FENTANYL CITRATE 100 MCG: 50 INJECTION, SOLUTION INTRAMUSCULAR; INTRAVENOUS at 07:43

## 2017-02-17 RX ADMIN — ONDANSETRON 4 MG: 2 INJECTION INTRAMUSCULAR; INTRAVENOUS at 09:42

## 2017-02-17 RX ADMIN — KETOROLAC TROMETHAMINE 30 MG: 30 INJECTION, SOLUTION INTRAMUSCULAR at 09:55

## 2017-02-17 RX ADMIN — SODIUM CHLORIDE, POTASSIUM CHLORIDE, SODIUM LACTATE AND CALCIUM CHLORIDE: 600; 310; 30; 20 INJECTION, SOLUTION INTRAVENOUS at 07:38

## 2017-02-17 RX ADMIN — Medication 10 MG: at 08:00

## 2017-02-17 RX ADMIN — CEFAZOLIN SODIUM 2 G: 2 INJECTION, SOLUTION INTRAVENOUS at 07:49

## 2017-02-17 NOTE — ANESTHESIA POSTPROCEDURE EVALUATION
Patient: Coco Wu    Procedure(s):  Laparoscopic Single Port Supracervical Hysterectomy, Bilateral Laparoscopic Salpingectomy - Wound Class: II-Clean Contaminated   - Wound Class: II-Clean Contaminated    Diagnosis:Uterine Fibroids   Diagnosis Additional Information: No value filed.    Anesthesia Type:  General, ETT    Note:  Anesthesia Post Evaluation    Patient location during evaluation: PACU  Patient participation: Able to fully participate in evaluation  Level of consciousness: awake  Pain management: adequate  Airway patency: patent  Cardiovascular status: acceptable  Respiratory status: acceptable  Hydration status: balanced  PONV: none     Anesthetic complications: None          Last vitals:  Vitals:    02/17/17 1045 02/17/17 1130 02/17/17 1145   BP:  114/67 121/62   Resp:  10 16   Temp: 36.6  C (97.8  F) 36.5  C (97.7  F)    SpO2:  94% 94%         Electronically Signed By: Bartolome Lr MD  February 17, 2017  12:15 PM

## 2017-02-17 NOTE — OP NOTE
Gynecologic Operative Note   Coco Wu  [id number]  2/17/2017    Preoperative Diagnosis: Abnormal uterine bleeding, fibroid uterus, desires definitive management, desires cervical conservation  Postoperative Diagnosis: Same; endometriosis     Procedure: Supracervical single-port laparoscopic hysterectomy with bilateral salpingectomy    Surgeon: Kelsie Wolf MD   Assist: Michelle Evans MD (presence required due to complicated nature of the surgery), Conner Alberto MD, Smiley Chiang MD    Anesthesia: GETA  Complications: None apparent     EBL: 100 mL   IVF: 2000 mL crystalloid   UOP: 500 mL clear urine     Findings: Exam under anesthesia revealed anteverted uterus.  Upon entry of the abdomen with the laparoscope, uterus appeared grossly normal; single endometriotic implant on L pelvic sidewall near adnexa.  Otherwise normal bilateral tubes and ovaries.   A survey of the upper abdomen showed normal anatomy including liver edge    Specimen: Uterus, bilateral fallopian tubes    Indications: Coco Wu was admitted as a 46 year old female who presented for the above procedure.  Evaluation of abnormal uterine bleeding revealed fibroid uterus and normal endometrial pathology; she desired definitive therapy. A hysterectomy was recommended and the patient strongly desired cervical conservation.  All risks, benefits and alternatives were discussed and written informed consent was obtained.     Procedure: The patient was taken to the operating room where general anesthesia was induced without difficulty. She was then examined under anesthesia with the above noted findings. She was then prepared and draped in the normal sterile fashion in the dorsal lithotomy position using yellow fin stirrups. Attention was turned to the vagina where acorn uterine manipulator was placed without difficulty     Attention was then turned to the abdomen where a 3-4cm infra-umbilical skin incision was made. Trixie technique was used to  dissect down to fascia and extend fascial defect along length of the incision.  Johann type retractor and gelport were placed with three 5mm laparoscopic trocars placed per  recommendations.    A survey of the patient's abdomen and pelvis was made with the above noted findings. Attention was then turned to the pelvis where bilateral fallopian tubes were amputated down to their cornual insertions and removed through 5mm ports.  Thereafter the L round and UO ligaments were ligated and transected using gyrus device, with subsequent passes along the anterior and posterior peritoneum allowing uterine artery skeletonization and development of the bladder flap.  The same series of steps were taken on the right side thereafter, with care taken to connect the bladder flap dissection.  Uterine arteries were then ligated and transected using the gyrus device.  Electrocautery loop was passed over the uterus and tightened at the level of the cervix, above uterine artery transection site with care taken to avoid bowel and bladder; it was activated and uterus thus amputated without difficulty.  Uterus was placed in a bag, brought up to the 3-4cm skin incision, and hand morcellated in the bag with care taken to contain all contents.      A final look at the surgical sites showed excellent hemostasis laparosopically.  Fascia was closed with 0 vicryl suture and skin closed with 4-0 monocryl    The patient tolerated the procedure well. The patient was taken to the recovery area in stable condition.        Sridhar Wolf and Nathan were present and scrubbed for the entire procedure.    Conner Alberto MD 9:56 AM 2/17/2017

## 2017-02-17 NOTE — OR NURSING
Pt very sleepy. I asked her if she feels like she is able to get up to a chair. She said she felt she was too sleepy. Plan to let pt nap for 20 minutes and then arouse and assess again.

## 2017-02-17 NOTE — IP AVS SNAPSHOT
[facility] [place]    [address] [facility] [address]    Phone:  [phone]                                       After Visit Summary   2/17/2017    Coco Wu    MRN: [id number]           After Visit Summary Signature Page     I have received my discharge instructions, and my questions have been answered. I have discussed any challenges I see with this plan with the nurse or doctor.    ..........................................................................................................................................  Patient/Patient Representative Signature      ..........................................................................................................................................  Patient Representative Print Name and Relationship to Patient    ..................................................               ................................................  Date                                            Time    ..........................................................................................................................................  Reviewed by Signature/Title    ...................................................              ..............................................  Date                                                            Time

## 2017-02-17 NOTE — ANESTHESIA PROCEDURE NOTES
Peripheral Nerve Block Procedure Note    Staff:     Anesthesiologist:  NELLA QUEVEDO  Location: Pre-op  Procedure Start/Stop TImes:      2/17/2017 7:14 AM     2/17/2017 7:25 AM    patient identified, IV checked, site marked, risks and benefits discussed, informed consent, monitors and equipment checked, pre-op evaluation, at physician/surgeon's request and post-op pain management      Correct Patient: Yes      Correct Position: Yes      Correct Site: Yes      Correct Procedure: Yes      Correct Laterality:  Yes    Site Marked:  Yes  Procedure details:     Procedure:  TAP    Laterality:  Bilateral    Position:  Supine    Sterile Prep: chloraprep, mask and sterile gloves      Local skin infiltration:  None    Needle:  Short bevel    Needle gauge:  20    Needle length (inches):  4    Ultrasound: Yes      Ultrasound used to identify targeted nerve, plexus, or vascular structure and placed a needle adjacent to it      Permanent Image entered into patiient's record      Abnormal pain on injection: No      Blood Aspirated: No      Paresthesias:  No    Bleeding at site: No      Test dose negative for signs of intravascular injection: Yes      Bolus via:  Needle    Infusion Method:  Single Shot    Complications:  None  Assessment/Narrative:     Injection made incrementally with aspirations every (mL):  5

## 2017-02-17 NOTE — IP AVS SNAPSHOT
MRN:[id number]                      After Visit Summary   2/17/2017    Coco Wu    MRN: [id number]           Thank you!     Thank you for choosing [facility] for your care. Our goal is always to provide you with excellent care. Hearing back from our patients is one way we can continue to improve our services. Please take a few minutes to complete the written survey that you may receive in the mail after you visit with us. Thank you!        Patient Information     Date Of Birth          1970        About your hospital stay     You were admitted on:  February 17, 2017 You last received care in the:   PACU    You were discharged on:  February 17, 2017       Who to Call     For medical emergencies, please call 911.  For non-urgent questions about your medical care, please call your primary care provider or clinic, [phone]  For questions related to your surgery, please call your surgery clinic        Attending Provider     Provider Armida Duenas MD OB/Gyn       Primary Care Provider Office Phone # Fax #    Poonam Mix [phone]       [address] [address]        After Care Instructions     Discharge Instructions       Resume pre procedure diet            Discharge Instructions       Pelvic Rest. No tampons, douching or intercourse for  2  weeks.            Discharge Instructions       Patient to arrange follow up appointment in Ob/Gyn clinic for post-op visit in 4-6 weeks            Ice to affected area       PRN as tolerated            No lifting       No lifting over 15 pounds and no strenuous physical activity.  For 6 weeks                  Further instructions from your care team       [facility]  Same-Day Surgery   Adult Discharge Orders & Instructions     For 24 hours after surgery    1. Get plenty of rest.  A responsible adult must stay with you for at least 24  hours after you leave the hospital.   2. Do not drive or use heavy equipment.  If you have weakness or tingling, don't drive or use heavy equipment until this feeling goes away.  3. Do not drink alcohol.  4. Avoid strenuous or risky activities.  Ask for help when climbing stairs.   5. You may feel lightheaded.  IF so, sit for a few minutes before standing.  Have someone help you get up.   6. If you have nausea (feel sick to your stomach): Drink only clear liquids such as apple juice, ginger ale, broth or 7-Up.  Rest may also help.  Be sure to drink enough fluids.  Move to a regular diet as you feel able.  7. You may have a slight fever. Call the doctor if your fever is over 100 F (37.7 C) (taken under the tongue) or lasts longer than 24 hours.  8. You may have a dry mouth, a sore throat, muscle aches or trouble sleeping.  These should go away after 24 hours.  9. Do not make important or legal decisions.   Call your doctor for any of the followin.  Signs of infection (fever, growing tenderness at the surgery site, a large amount of drainage or bleeding, severe pain, foul-smelling drainage, redness, swelling).    2. It has been over 8 to 10 hours since surgery and you are still not able to urinate (pass water).    3.  Headache for over 24 hours.    4.  Numbness, tingling or weakness the day after surgery (if you had spinal anesthesia).  To contact a doctor, call ________________________________________ or:        [phone] and ask for the resident on call for   ______________________________________________ (answered 24 hours a day)      Emergency Department:    [facility]: [phone]       (TTY for hearing impaired: [phone])    [facility]: [phone]       (TTY for hearing impaired: [phone])    Discharge Instructions:   Following a Laparoscopy    Comfort:    The amount of discomfort you can expect is very unpredictable.     If you have pain that cannot be controlled with non  "aspirin medication or with the prescription medication you may have received, you should notify your physician.     You May Experience:    Abdominal tenderness; abdominal cramps (like menstrual cramps).    Low back ache or discomfort radiating to your shoulders, chest, back or neck. This is a result of the gas used to inflate your abdomen during surgery. This gas is absorbed in 24 to 36 hours. The \"knee chest\" position will help relieve this discomfort.    Sore throat for a day or two resulting from the anesthesia tube used during surgery. You may use throat lozenges to help relieve this discomfort.    Black and blue marks on your abdomen.    Drainage:    You may expect a small amount of drainage from the incision on your abdomen and you may change the bandage when necessary.    You may also have a small amount of vaginal drainage for 3 to 4 days; this is normal and no cause for concern. If excessive bleeding occurs, notify your physician.    Do not douche, and use a pad rather than tampons. Do not resume intercourse for at least one week or until bleeding has ceased.    Home Activity:    The day of surgery spend a quiet day at home.    Increase activity as tolerated.    You may bathe or shower, do not soak in bath tub or scrub incisions.    You have no restrictions on your diet. Following surgery, drink plenty of fluids and eat a light meal.    The anesthesia may produce some nausea. If you feel nauseated, stay in bed, keep your head down and try drinking fluids such as Seven-Up, tea or soup.    Notify Physician at Once IF:    You have a fever over 100 degrees. A low grade fever (under 100 degrees) is usual after surgery.    You have severe pain.    You have a large amount of bleeding or drainage.    Discharge Instructions: Vaginal or Abdominal Hysterectomy   Healing takes time. How much time depends on your health and the type of surgery you had. During your recovery time, you can do a lot to make sure that you " regain your health and energy.    Diet    Eat a well-balanced diet with lots of protein, fruits, vegetables, and whole grains.  Avoid spicy or greasy foods.     Drink plenty of fluids - at least 8 tall glasses of water a day. Water is best. Limit caffeine (coffee, tea, soda) to help prevent constipation (hard stools that are difficult to pass).    If you are constipated, you may take one of these medications from the drug store: Docusate (Colace), Docusate with Casanthranol (Siri-Colace), Psyllium (Metamucil), or Milk of Magnesia. Follow to directions on the label.  Activity    Get plenty of rest at first. Slowly return to your normal routine. Several short walks each day will help. It is okay to climb stairs, but use the handrail in case you feel dizzy.     After 2 weeks, you may start gentle exercises. Listen to your body. If you feel tired, sore, or have backaches, you may be doing too much too soon.     Do not drive until you can step on the brakes without pain.     Do not use tampons, douche, or have sex (intercourse) until you see your doctor.     For the next 6 weeks, do not lift anything greater than 15 pounds and avoid heavy exercise.  Pain    Your pain should decrease over the next 2-3 weeks.     You may feel sore after mild exercise.    Take your pain medication as prescribed by your doctor.   Caring for your Incisions (if applicable)    You may see some fluid draining from your incision(s). Wear the bandage(s) until it stops.     Keep the area clean and dry. Wash with soap and water.    Do not use lotions or powders near the incision(s).    If you have:  o Steri-strips (small pieces of tape) - they should fall off on their own in 7-10 days. If that time has passed and they are still in place, you may remove them.  o Staples - These will be removed at your next visit.   o Dermabond (medical glue) - Leave it in place until it wears off.   Bathing  Take care to avoid slips and falls. Gently pat your  incisions dry after bathing.    After Abdominal Hysterectomy (surgery through the belly): You may shower. Avoid tub baths and swimming for two weeks, of until your incision heals.     After Vaginal Hysterectomy (surgery through the vagina): You may bathe or shower. If you had surgery to repair the vaginal wall, it may helps to soak in a warm bath for 20 minutes, twice a day. This will speed healing and reduce tenderness.   What to expect after surgery    A small amount of blood or fluid coming from your vagina for several weeks. Wear pads as needed.     Stitches poking out of the vagina.     Stitches passing out of the vagina (they will look like tiny threads).    Most stitches dissolve within 3 months.     Feeling numb around your stitches. This should go away in less than a year.     Feeling dizzy or light-headed. Hot flashes, trouble sleeping, sudden mood swings, and irritability. If side effects become a problem, notify your doctor.     If you had a vaginal repair, you may feel tugging in the vagina. This is a normal part of healing.   Call your doctor if you have:    Severe chills and a fever of 100.4 degrees F or higher, taken under the tongue.     Bright red blood coming out of the vagina - enough to soak one pad an hour.     Large clots coming out of the vagina.     Urine or vaginal fluid that smells bad.     Trouble urinating (peeing), burning when you go, or the need to go more often.     Calf pain and/or swelling in both legs.    Nausea (feeling sick to your stomach) or vomiting (throwing up).    Pain that you cannot control with the pain medication prescribed by your doctor.   Follow up with your doctor and return to the clinic in ***  Make this appointment after you get home if it has not already been scheduled.                 REV. 5/12    Pending Results     Date and Time Order Name Status Description    2/17/2017 0942 Surgical pathology exam In process             Admission Information     Date &  "Time Provider Department Dept. Phone    2/17/2017 Armida Wolf MD  PACU [phone]      Your Vitals Were     Blood Pressure Temperature Respirations Height Weight Last Period    121/62 97.7  F (36.5  C) (Oral) 16 1.727 m (5' 8\") 98.2 kg (216 lb 7.9 oz) 02/15/2017    Pulse Oximetry BMI (Body Mass Index)                94% 32.92 kg/m2          [employer] Information     [employer] gives you secure access to your electronic health record. If you see a primary care provider, you can also send messages to your care team and make appointments. If you have questions, please call your primary care clinic.  If you do not have a primary care provider, please call [phone] and they will assist you.        Care EveryWhere ID     This is your Care EveryWhere ID. This could be used by other organizations to access your [facility] medical records  [id number]           Review of your medicines      START taking        Dose / Directions    HYDROcodone-acetaminophen 5-325 MG per tablet   Commonly known as:  NORCO   Used for:  S/P hysterectomy        Dose:  1-2 tablet   Take 1-2 tablets by mouth every 4 hours as needed for other (Moderate to Severe Pain)   Quantity:  20 tablet   Refills:  0       ibuprofen 600 MG tablet   Commonly known as:  ADVIL/MOTRIN   Used for:  S/P hysterectomy        Dose:  600 mg   Take 1 tablet (600 mg) by mouth every 6 hours as needed for pain (mild)   Quantity:  30 tablet   Refills:  0       senna-docusate 8.6-50 MG per tablet   Commonly known as:  SENOKOT-S;PERICOLACE   Used for:  S/P hysterectomy        Dose:  1-2 tablet   Take 1-2 tablets by mouth 2 times daily Take while on oral narcotics to prevent or treat constipation.   Quantity:  30 tablet   Refills:  0         CONTINUE these medicines which have NOT CHANGED        Dose / Directions    CLARITIN 10 MG tablet   Generic drug:  loratadine        Dose:  10 mg   Take 1 tablet (10 mg) by mouth daily   Refills:  0       GINSENG ROYAL JELLY PLUS " 375-1-15 MG-MG-MCG Caps        Dose:  1 capsule   Take 1 capsule by mouth daily   Refills:  0       magnesium oxide 400 (241.3 MG) MG tablet   Commonly known as:  MAG-OX        Dose:  400 mg   Take 1 tablet (400 mg) by mouth daily   Refills:  0            Where to get your medicines      These medications were sent to [facility] [place], [place] - [address]  [address] [facility], [facility] [address]     Phone:  [phone]     ibuprofen 600 MG tablet    senna-docusate 8.6-50 MG per tablet         Some of these will need a paper prescription and others can be bought over the counter. Ask your nurse if you have questions.     Bring a paper prescription for each of these medications     HYDROcodone-acetaminophen 5-325 MG per tablet                Protect others around you: Learn how to safely use, store and throw away your medicines at www.disposemymeds.org.             Medication List: This is a list of all your medications and when to take them. Check marks below indicate your daily home schedule. Keep this list as a reference.      Medications           Morning Afternoon Evening Bedtime As Needed    CLARITIN 10 MG tablet   Take 1 tablet (10 mg) by mouth daily   Generic drug:  loratadine                                GINSENG ROYAL JELLY PLUS 375-1-15 MG-MG-MCG Caps   Take 1 capsule by mouth daily                                HYDROcodone-acetaminophen 5-325 MG per tablet   Commonly known as:  NORCO   Take 1-2 tablets by mouth every 4 hours as needed for other (Moderate to Severe Pain)                                ibuprofen 600 MG tablet   Commonly known as:  ADVIL/MOTRIN   Take 1 tablet (600 mg) by mouth every 6 hours as needed for pain (mild)                                magnesium oxide 400 (241.3 MG) MG tablet   Commonly known as:  MAG-OX   Take 1 tablet (400 mg) by mouth daily                                senna-docusate 8.6-50 MG per tablet   Commonly known as:   SENOKOT-S;PERICOLACE   Take 1-2 tablets by mouth 2 times daily Take while on oral narcotics to prevent or treat constipation.

## 2017-02-17 NOTE — ANESTHESIA PREPROCEDURE EVALUATION
Anesthesia Evaluation     .        ROS/MED HX    ENT/Pulmonary:  - neg pulmonary ROS     Neurologic:  - neg neurologic ROS     Cardiovascular:  - neg cardiovascular ROS       METS/Exercise Tolerance:     Hematologic:  - neg hematologic  ROS       Musculoskeletal:  - neg musculoskeletal ROS       GI/Hepatic:  - neg GI/hepatic ROS   (+) GERD       Renal/Genitourinary:  - ROS Renal section negative       Endo:  - neg endo ROS       Psychiatric:  - neg psychiatric ROS       Infectious Disease:  - neg infectious disease ROS       Malignancy:      - no malignancy   Other:    - neg other ROS           Physical Exam  Normal systems: cardiovascular, pulmonary and dental    Airway   Mallampati: II  TM distance: >3 FB  Neck ROM: full    Dental     Cardiovascular       Pulmonary                     Anesthesia Plan      History & Physical Review  History and physical reviewed and following examination; no interval change.    ASA Status:  1 .    NPO Status:  > 8 hours    Plan for General and ETT with Intravenous induction. Maintenance will be Balanced.    PONV prophylaxis:  Ondansetron (or other 5HT-3) and Dexamethasone or Solumedrol       Postoperative Care  Postoperative pain management:  IV analgesics and Oral pain medications.      Consents  Anesthetic plan, risks, benefits and alternatives discussed with:  Patient..                          .

## 2017-02-17 NOTE — DISCHARGE INSTRUCTIONS
[facility]  Same-Day Surgery   Adult Discharge Orders & Instructions     For 24 hours after surgery    1. Get plenty of rest.  A responsible adult must stay with you for at least 24 hours after you leave the hospital.   2. Do not drive or use heavy equipment.  If you have weakness or tingling, don't drive or use heavy equipment until this feeling goes away.  3. Do not drink alcohol.  4. Avoid strenuous or risky activities.  Ask for help when climbing stairs.   5. You may feel lightheaded.  IF so, sit for a few minutes before standing.  Have someone help you get up.   6. If you have nausea (feel sick to your stomach): Drink only clear liquids such as apple juice, ginger ale, broth or 7-Up.  Rest may also help.  Be sure to drink enough fluids.  Move to a regular diet as you feel able.  7. You may have a slight fever. Call the doctor if your fever is over 100 F (37.7 C) (taken under the tongue) or lasts longer than 24 hours.  8. You may have a dry mouth, a sore throat, muscle aches or trouble sleeping.  These should go away after 24 hours.  9. Do not make important or legal decisions.   Call your doctor for any of the followin.  Signs of infection (fever, growing tenderness at the surgery site, a large amount of drainage or bleeding, severe pain, foul-smelling drainage, redness, swelling).    2. It has been over 8 to 10 hours since surgery and you are still not able to urinate (pass water).    3.  Headache for over 24 hours.    4.  Numbness, tingling or weakness the day after surgery (if you had spinal anesthesia).  To contact a doctor, call ________________________________________ or:        [phone] and ask for the resident on call for   ______________________________________________ (answered 24 hours a day)      Emergency Department:    [facility]: [phone]       (TTY for hearing impaired: [phone])    [facility]: [phone]       (TTY for  "hearing impaired: [phone])    Discharge Instructions:   Following a Laparoscopy    Comfort:    The amount of discomfort you can expect is very unpredictable.     If you have pain that cannot be controlled with non aspirin medication or with the prescription medication you may have received, you should notify your physician.     You May Experience:    Abdominal tenderness; abdominal cramps (like menstrual cramps).    Low back ache or discomfort radiating to your shoulders, chest, back or neck. This is a result of the gas used to inflate your abdomen during surgery. This gas is absorbed in 24 to 36 hours. The \"knee chest\" position will help relieve this discomfort.    Sore throat for a day or two resulting from the anesthesia tube used during surgery. You may use throat lozenges to help relieve this discomfort.    Black and blue marks on your abdomen.    Drainage:    You may expect a small amount of drainage from the incision on your abdomen and you may change the bandage when necessary.    You may also have a small amount of vaginal drainage for 3 to 4 days; this is normal and no cause for concern. If excessive bleeding occurs, notify your physician.    Do not douche, and use a pad rather than tampons. Do not resume intercourse for at least one week or until bleeding has ceased.    Home Activity:    The day of surgery spend a quiet day at home.    Increase activity as tolerated.    You may bathe or shower, do not soak in bath tub or scrub incisions.    You have no restrictions on your diet. Following surgery, drink plenty of fluids and eat a light meal.    The anesthesia may produce some nausea. If you feel nauseated, stay in bed, keep your head down and try drinking fluids such as Seven-Up, tea or soup.    Notify Physician at Once IF:    You have a fever over 100 degrees. A low grade fever (under 100 degrees) is usual after surgery.    You have severe pain.    You have a large amount of bleeding or " drainage.    Discharge Instructions: Vaginal or Abdominal Hysterectomy   Healing takes time. How much time depends on your health and the type of surgery you had. During your recovery time, you can do a lot to make sure that you regain your health and energy.    Diet    Eat a well-balanced diet with lots of protein, fruits, vegetables, and whole grains.  Avoid spicy or greasy foods.     Drink plenty of fluids - at least 8 tall glasses of water a day. Water is best. Limit caffeine (coffee, tea, soda) to help prevent constipation (hard stools that are difficult to pass).    If you are constipated, you may take one of these medications from the drug store: Docusate (Colace), Docusate with Casanthranol (Siri-Colace), Psyllium (Metamucil), or Milk of Magnesia. Follow to directions on the label.  Activity    Get plenty of rest at first. Slowly return to your normal routine. Several short walks each day will help. It is okay to climb stairs, but use the handrail in case you feel dizzy.     After 2 weeks, you may start gentle exercises. Listen to your body. If you feel tired, sore, or have backaches, you may be doing too much too soon.     Do not drive until you can step on the brakes without pain.     Do not use tampons, douche, or have sex (intercourse) until you see your doctor.     For the next 6 weeks, do not lift anything greater than 15 pounds and avoid heavy exercise.  Pain    Your pain should decrease over the next 2-3 weeks.     You may feel sore after mild exercise.    Take your pain medication as prescribed by your doctor.   Caring for your Incisions (if applicable)    You may see some fluid draining from your incision(s). Wear the bandage(s) until it stops.     Keep the area clean and dry. Wash with soap and water.    Do not use lotions or powders near the incision(s).    If you have:  o Steri-strips (small pieces of tape) - they should fall off on their own in 7-10 days. If that time has passed and they are  still in place, you may remove them.  o Staples - These will be removed at your next visit.   o Dermabond (medical glue) - Leave it in place until it wears off.   Bathing  Take care to avoid slips and falls. Gently pat your incisions dry after bathing.    After Abdominal Hysterectomy (surgery through the belly): You may shower. Avoid tub baths and swimming for two weeks, of until your incision heals.     After Vaginal Hysterectomy (surgery through the vagina): You may bathe or shower. If you had surgery to repair the vaginal wall, it may helps to soak in a warm bath for 20 minutes, twice a day. This will speed healing and reduce tenderness.   What to expect after surgery    A small amount of blood or fluid coming from your vagina for several weeks. Wear pads as needed.     Stitches poking out of the vagina.     Stitches passing out of the vagina (they will look like tiny threads).    Most stitches dissolve within 3 months.     Feeling numb around your stitches. This should go away in less than a year.     Feeling dizzy or light-headed. Hot flashes, trouble sleeping, sudden mood swings, and irritability. If side effects become a problem, notify your doctor.     If you had a vaginal repair, you may feel tugging in the vagina. This is a normal part of healing.   Call your doctor if you have:    Severe chills and a fever of 100.4 degrees F or higher, taken under the tongue.     Bright red blood coming out of the vagina - enough to soak one pad an hour.     Large clots coming out of the vagina.     Urine or vaginal fluid that smells bad.     Trouble urinating (peeing), burning when you go, or the need to go more often.     Calf pain and/or swelling in both legs.    Nausea (feeling sick to your stomach) or vomiting (throwing up).    Pain that you cannot control with the pain medication prescribed by your doctor.   Follow up with your doctor and return to the clinic in   Make this appointment after you get home if it has  not already been scheduled.                 REV. 5/12

## 2017-02-17 NOTE — ANESTHESIA CARE TRANSFER NOTE
Patient: Coco Wu    Procedure(s):  Laparoscopic Single Port Supracervical Hysterectomy, Bilateral Laparoscopic Salpingectomy - Wound Class: II-Clean Contaminated   - Wound Class: II-Clean Contaminated    Diagnosis: Uterine Fibroids   Diagnosis Additional Information: No value filed.    Anesthesia Type:   General, ETT     Note:  Airway :Face Mask  Patient transferred to:PACU        Vitals: (Last set prior to Anesthesia Care Transfer)    CRNA VITALS  2/17/2017 0945 - 2/17/2017 1024      2/17/2017             Pulse: 85    SpO2: 100 %    Resp Rate (observed): (!)  2    Resp Rate (set): 8                Electronically Signed By: HITESH Robbins CRNA  February 17, 2017  10:24 AM

## 2017-02-20 LAB — COPATH REPORT: NORMAL

## 2017-03-03 ENCOUNTER — OFFICE VISIT (OUTPATIENT)
Dept: OBGYN | Facility: CLINIC | Age: 47
End: 2017-03-03
Payer: COMMERCIAL

## 2017-03-03 VITALS
DIASTOLIC BLOOD PRESSURE: 72 MMHG | HEART RATE: 65 BPM | SYSTOLIC BLOOD PRESSURE: 103 MMHG | HEIGHT: 68 IN | TEMPERATURE: 98 F | BODY MASS INDEX: 33.8 KG/M2 | WEIGHT: 223 LBS

## 2017-03-03 DIAGNOSIS — Z90.711 S/P LAPAROSCOPIC SUPRACERVICAL HYSTERECTOMY: Primary | ICD-10-CM

## 2017-03-03 PROBLEM — N92.0 EXCESSIVE OR FREQUENT MENSTRUATION: Status: RESOLVED | Noted: 2017-01-18 | Resolved: 2017-03-03

## 2017-03-03 PROBLEM — D25.1 INTRAMURAL LEIOMYOMA OF UTERUS: Status: RESOLVED | Noted: 2017-01-18 | Resolved: 2017-03-03

## 2017-03-03 PROCEDURE — 99024 POSTOP FOLLOW-UP VISIT: CPT | Performed by: OBSTETRICS & GYNECOLOGY

## 2017-03-03 NOTE — NURSING NOTE
"Chief Complaint   Patient presents with     RECHECK     follow up surgery       Initial /72  Pulse 65  Temp 98  F (36.7  C) (Oral)  Ht 5' 8\" (1.727 m)  Wt 223 lb (101.2 kg)  LMP 02/15/2017  BMI 33.91 kg/m2 Estimated body mass index is 33.91 kg/(m^2) as calculated from the following:    Height as of this encounter: 5' 8\" (1.727 m).    Weight as of this encounter: 223 lb (101.2 kg).  BP completed using cuff size: X-large        The following HM Due: NONE      The following patient reported/Care Every where data was sent to:  P ABSTRACT QUALITY INITIATIVES [36057]       patient has appointment for today  Mary Noguera               "

## 2017-03-03 NOTE — PROGRESS NOTES
"S: Coco Wu is a 46 year old female who presents for post operative check. She is 2 weeks status post single port laparoscopic LYDIA with salpingectomy.  She presented for menorrhagia. She reports doing well and denies significant pain or bleeding. Pathological findings significant for normal uterus    O.  /72  Pulse 65  Temp 98  F (36.7  C) (Oral)  Ht 5' 8\" (1.727 m)  Wt 223 lb (101.2 kg)  LMP 02/15/2017  BMI 33.91 kg/m2     Abd: soft, non-tender, non-distended. Incision clear, dry, and intact without evidence of infection  Ex; no calf tend    A. S/P [facility]       Doing well    P. Ok to start increasing activity.  RTW 3/27, formed signed.  Follow up prn or when due for next annual exam.  AMERICA RICHARDSON MD      "

## 2017-03-03 NOTE — MR AVS SNAPSHOT
"              After Visit Summary   3/3/2017    Coco Wu    MRN: [id number]           Patient Information     Date Of Birth          1970        Visit Information        Provider Department      3/3/2017 11:30 AM Armida Wolf [place] [facility] [place]'s Diagnoses     S/P laparoscopic supracervical hysterectomy    -  1       Follow-ups after your visit        Who to contact     If you have questions or need follow up information about today's clinic visit or your schedule please contact [facility] [place] directly at [phone].  Normal or non-critical lab and imaging results will be communicated to you by JobSlothart, letter or phone within 4 business days after the clinic has received the results. If you do not hear from us within 7 days, please contact the clinic through "[employer]t or phone. If you have a critical or abnormal lab result, we will notify you by phone as soon as possible.  Submit refill requests through [employer]videa or call your pharmacy and they will forward the refill request to us. Please allow 3 business days for your refill to be completed.          Additional Information About Your Visit        MyChart Information     [employer] gives you secure access to your electronic health record. If you see a primary care provider, you can also send messages to your care team and make appointments. If you have questions, please call your primary care clinic.  If you do not have a primary care provider, please call [phone] and they will assist you.        Care EveryWhere ID     This is your Care EveryWhere ID. This could be used by other organizations to access your [place] medical records  [id number]        Your Vitals Were     Pulse Temperature Height Last Period BMI (Body Mass Index)       65 98  F (36.7  C) (Oral) 5' 8\" (1.727 m) 02/15/2017 33.91 kg/m2        Blood Pressure from Last 3 Encounters:   03/03/17 103/72   02/17/17 114/67   01/23/17 104/70    Weight from " Last 3 Encounters:   03/03/17 223 lb (101.2 kg)   02/17/17 216 lb 7.9 oz (98.2 kg)   01/23/17 220 lb (99.8 kg)              Today, you had the following     No orders found for display       Primary Care Provider Office Phone # Fax #    Poonam Mix [phone]       [facility] [address] [facility] [address]        Thank you!     [place] you [place] [facility] [place]. Our goal is always to provide you with excellent care. Hearing back from our patients is one way we can continue to improve our services. Please take a few minutes to complete the written survey that you may receive in the mail after your visit with us. Thank you!             Your Updated Medication List - Protect others around you: Learn how to safely use, store and throw away your medicines at www.disposemymeds.org.          This list is accurate as of: 3/3/17 12:02 PM.  Always use your most recent med list.                   Brand Name Dispense Instructions for use    CLARITIN 10 MG tablet   Generic drug:  loratadine      Take 1 tablet (10 mg) by mouth daily       GINSENG ROYAL JELLY PLUS 375-1-15 MG-MG-MCG Caps      Take 1 capsule by mouth daily       magnesium oxide 400 (241.3 MG) MG tablet    MAG-OX     Take 1 tablet (400 mg) by mouth daily

## 2017-05-05 ENCOUNTER — MYC MEDICAL ADVICE (OUTPATIENT)
Dept: FAMILY MEDICINE | Facility: CLINIC | Age: 47
End: 2017-05-05

## 2017-05-08 NOTE — TELEPHONE ENCOUNTER
Hi Coco,  It is so nice of you to think of me for this!  I don't take students for a variety of reasons but some of my partners do and I will run this by them. 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    Poonam Mix MD

## 2017-05-13 ENCOUNTER — MYC MEDICAL ADVICE (OUTPATIENT)
Dept: FAMILY MEDICINE | Facility: CLINIC | Age: 47
End: 2017-05-13

## 2017-05-15 NOTE — TELEPHONE ENCOUNTER
Routing to provider - Juliette/Hiro - please review and advise as appropriate  1. Student requesting provider contact information - would you like E-mails released?    Thank you,  MARIZA Shea

## 2017-09-28 ENCOUNTER — MYC MEDICAL ADVICE (OUTPATIENT)
Dept: FAMILY MEDICINE | Facility: CLINIC | Age: 47
End: 2017-09-28

## 2018-02-16 ENCOUNTER — OFFICE VISIT (OUTPATIENT)
Dept: FAMILY MEDICINE | Facility: CLINIC | Age: 48
End: 2018-02-16
Payer: COMMERCIAL

## 2018-02-16 ENCOUNTER — MYC MEDICAL ADVICE (OUTPATIENT)
Dept: FAMILY MEDICINE | Facility: CLINIC | Age: 48
End: 2018-02-16

## 2018-02-16 VITALS
DIASTOLIC BLOOD PRESSURE: 65 MMHG | HEART RATE: 71 BPM | BODY MASS INDEX: 30.11 KG/M2 | SYSTOLIC BLOOD PRESSURE: 104 MMHG | TEMPERATURE: 98.5 F | WEIGHT: 198 LBS | OXYGEN SATURATION: 99 %

## 2018-02-16 DIAGNOSIS — M54.50 ACUTE BILATERAL LOW BACK PAIN WITHOUT SCIATICA: Primary | ICD-10-CM

## 2018-02-16 PROCEDURE — 99213 OFFICE O/P EST LOW 20 MIN: CPT | Performed by: FAMILY MEDICINE

## 2018-02-16 RX ORDER — DICLOFENAC SODIUM 75 MG/1
75 TABLET, DELAYED RELEASE ORAL 2 TIMES DAILY PRN
Qty: 30 TABLET | Refills: 0 | Status: SHIPPED | OUTPATIENT
Start: 2018-02-16 | End: 2021-03-02

## 2018-02-16 RX ORDER — PHENTERMINE HYDROCHLORIDE 37.5 MG/1
37.5 TABLET ORAL DAILY
COMMUNITY
Start: 2017-11-08 | End: 2021-03-02

## 2018-02-16 RX ORDER — CYCLOBENZAPRINE HCL 10 MG
10 TABLET ORAL
Qty: 14 TABLET | Refills: 0 | Status: SHIPPED | OUTPATIENT
Start: 2018-02-16 | End: 2021-03-02

## 2018-02-16 ASSESSMENT — PAIN SCALES - GENERAL: PAINLEVEL: EXTREME PAIN (8)
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
## 2018-02-16 NOTE — MR AVS SNAPSHOT
After Visit Summary   2/16/2018    Coco Wu    MRN: [id number]           Patient Information     Date Of Birth          1970        Visit Information        Provider Department      2/16/2018 1:20 PM Shawnee Oliva MD [facility]        Today's Diagnoses     Acute bilateral low back pain without sciatica    -  1       Follow-ups after your visit        Who to contact     If you have questions or need follow up information about today's clinic visit or your schedule please contact [facility] directly at [phone].  Normal or non-critical lab and imaging results will be communicated to you by MedLinkhart, letter or phone within 4 business days after the clinic has received the results. If you do not hear from us within 7 days, please contact the clinic through MedLinkhart or phone. If you have a critical or abnormal lab result, we will notify you by phone as soon as possible.  Submit refill requests through [employer] or call your pharmacy and they will forward the refill request to us. Please allow 3 business days for your refill to be completed.          Additional Information About Your Visit        MyChart Information     [employer] gives you secure access to your electronic health record. If you see a primary care provider, you can also send messages to your care team and make appointments. If you have questions, please call your primary care clinic.  If you do not have a primary care provider, please call [phone] and they will assist you.        Care EveryWhere ID     This is your Care EveryWhere ID. This could be used by other organizations to access your [place] medical records  [id number]        Your Vitals Were     Pulse Temperature Last Period Pulse Oximetry BMI (Body Mass Index)       71 98.5  F (36.9  C) (Oral) 02/15/2017 99% 30.11 kg/m2        Blood Pressure from Last 3 Encounters:   02/16/18 104/65   03/03/17 103/72   02/17/17  114/67    Weight from Last 3 Encounters:   02/16/18 198 lb (89.8 kg)   03/03/17 223 lb (101.2 kg)   02/17/17 216 lb 7.9 oz (98.2 kg)              Today, you had the following     No orders found for display         Today's Medication Changes          These changes are accurate as of 2/16/18  1:57 PM.  If you have any questions, ask your nurse or doctor.               Start taking these medicines.        Dose/Directions    cyclobenzaprine 10 MG tablet   Commonly known as:  FLEXERIL   Used for:  Acute bilateral low back pain without sciatica   Started by:  Shawnee Oliva MD        Dose:  10 mg   Take 1 tablet (10 mg) by mouth nightly as needed for muscle spasms   Quantity:  14 tablet   Refills:  0       diclofenac 75 MG EC tablet   Commonly known as:  VOLTAREN   Used for:  Acute bilateral low back pain without sciatica   Started by:  Shawnee Oliva MD        Dose:  75 mg   Take 1 tablet (75 mg) by mouth 2 times daily as needed for moderate pain   Quantity:  30 tablet   Refills:  0            Where to get your medicines      These medications were sent to [id number] - [place], [place] - [address] AT [facility] [address]  [address], [place] [address]     Phone:  [phone]     cyclobenzaprine 10 MG tablet    diclofenac 75 MG EC tablet                Primary Care Provider Office Phone # Fax #    Poonam Mix [phone]       [address] [facility] [address]        Equal Access to Services     MICKY HUGHES AH: Hadii michael ku hadasho Soomaali, waaxda luqadaha, qaybta kaalmada adeegyada, latricia coulter. So [facility] [phone].    ATENCIÓN: Si habla español, tiene a ontiveros disposición servicios gratuitos de asistencia lingüística. Llame al [phone].    We comply with applicable federal civil rights laws and [place] laws. We do not discriminate on the basis of race, color, national origin, age,  disability, sex, sexual orientation, or gender identity.            Thank you!     Thank you for choosing [facility]  for your care. Our goal is always to provide you with excellent care. Hearing back from our patients is one way we can continue to improve our services. Please take a few minutes to complete the written survey that you may receive in the mail after your visit with us. Thank you!             Your Updated Medication List - Protect others around you: Learn how to safely use, store and throw away your medicines at www.disposemymeds.org.          This list is accurate as of 2/16/18  1:57 PM.  Always use your most recent med list.                   Brand Name Dispense Instructions for use Diagnosis    CLARITIN 10 MG tablet   Generic drug:  loratadine      Take 1 tablet (10 mg) by mouth daily        cyclobenzaprine 10 MG tablet    FLEXERIL    14 tablet    Take 1 tablet (10 mg) by mouth nightly as needed for muscle spasms    Acute bilateral low back pain without sciatica       diclofenac 75 MG EC tablet    VOLTAREN    30 tablet    Take 1 tablet (75 mg) by mouth 2 times daily as needed for moderate pain    Acute bilateral low back pain without sciatica       GINSENG ROYAL JELLY PLUS 375-1-15 MG-MG-MCG Caps      Take 1 capsule by mouth daily        magnesium oxide 400 (241.3 MG) MG tablet    MAG-OX     Take 1 tablet (400 mg) by mouth daily        phentermine 37.5 MG tablet    ADIPEX-P     Take 37.5 mg by mouth daily

## 2018-02-16 NOTE — TELEPHONE ENCOUNTER
Sent message for patient to schedule office visit or go to [facility].  Patient has not been seen in clinic for greater than 1 year.  Asking for a new medication.  Bessie Kimble RN

## 2018-06-14 DIAGNOSIS — Z12.39 SCREENING BREAST EXAMINATION: ICD-10-CM

## 2018-06-29 ENCOUNTER — RADIANT APPOINTMENT (OUTPATIENT)
Dept: MAMMOGRAPHY | Facility: CLINIC | Age: 48
End: 2018-06-29
Attending: FAMILY MEDICINE
Payer: COMMERCIAL

## 2018-06-29 ENCOUNTER — RADIANT APPOINTMENT (OUTPATIENT)
Dept: ULTRASOUND IMAGING | Facility: CLINIC | Age: 48
End: 2018-06-29
Attending: FAMILY MEDICINE
Payer: COMMERCIAL

## 2018-06-29 DIAGNOSIS — R92.8 ABNORMAL MAMMOGRAM: ICD-10-CM

## 2018-06-29 PROCEDURE — 76642 ULTRASOUND BREAST LIMITED: CPT | Mod: LT | Performed by: STUDENT IN AN ORGANIZED HEALTH CARE EDUCATION/TRAINING PROGRAM

## 2018-06-29 PROCEDURE — G0279 TOMOSYNTHESIS, MAMMO: HCPCS | Performed by: STUDENT IN AN ORGANIZED HEALTH CARE EDUCATION/TRAINING PROGRAM

## 2018-06-29 PROCEDURE — 77065 DX MAMMO INCL CAD UNI: CPT | Performed by: STUDENT IN AN ORGANIZED HEALTH CARE EDUCATION/TRAINING PROGRAM

## 2018-11-17 ENCOUNTER — HEALTH MAINTENANCE LETTER (OUTPATIENT)
Age: 48
End: 2018-11-17

## 2019-07-26 DIAGNOSIS — Z12.39 SCREENING FOR BREAST CANCER: ICD-10-CM

## 2019-10-01 ENCOUNTER — HEALTH MAINTENANCE LETTER (OUTPATIENT)
Age: 49
End: 2019-10-01

## 2021-01-15 ENCOUNTER — HEALTH MAINTENANCE LETTER (OUTPATIENT)
Age: 51
End: 2021-01-15

## 2021-02-27 ASSESSMENT — ENCOUNTER SYMPTOMS
HEARTBURN: 0
NAUSEA: 0
CHILLS: 0
HEMATURIA: 0
FEVER: 0
DYSURIA: 0
COUGH: 0
ABDOMINAL PAIN: 0
BREAST MASS: 0
WEAKNESS: 0
PARESTHESIAS: 0
FREQUENCY: 0
DIZZINESS: 0
JOINT SWELLING: 0
HEMATOCHEZIA: 0
HEADACHES: 0
CONSTIPATION: 0
DIARRHEA: 0
SHORTNESS OF BREATH: 0
MYALGIAS: 1
EYE PAIN: 0
PALPITATIONS: 0
ARTHRALGIAS: 1
NERVOUS/ANXIOUS: 0

## 2021-02-28 ASSESSMENT — ENCOUNTER SYMPTOMS
SHORTNESS OF BREATH: 0
CHILLS: 0
WEAKNESS: 0
NAUSEA: 0
HEADACHES: 0
PALPITATIONS: 0
DIZZINESS: 0
JOINT SWELLING: 0
MYALGIAS: 1
FREQUENCY: 0
FEVER: 0
BREAST MASS: 0
NERVOUS/ANXIOUS: 0
PARESTHESIAS: 0
HEMATURIA: 0
DYSURIA: 0
HEMATOCHEZIA: 0
CONSTIPATION: 0
ARTHRALGIAS: 1
ABDOMINAL PAIN: 0
COUGH: 0
EYE PAIN: 0
HEARTBURN: 0
DIARRHEA: 0
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
## 2021-02-28 NOTE — PATIENT INSTRUCTIONS
If you have not already received your COVID vaccination, please register on the [place] Vaccine Connector website so you will be notified when it is your turn to receive the vaccine.  You can register at: [email or web address]://[email or web address]    Did you know?   I do telehealth (video) visits exclusively on .  I still do in-person visits at [facility] on ,  and .  You can schedule a video visit for follow-up appointments as well as future appointments for certain conditions.  Please see the below link.  [email or web address]    If you have not already done so,  I encourage you to sign up for [email or web address] ([email or web address]).  This will allow you to review your results, securely communicate with a provider, and schedule virtual visits as well.    If you are due for a mammogram or colonoscopy please call the [place] [facility] Team (Mammogram and Colonoscopy scheduling) at [phone].     To schedule any ordered imaging studies you can call [facility] [phone].  If you are scheduling a DEXA (bone density) scan please do NOT schedule this at the [facility] [place].  Please ask that it be done at [facility] in [place].  Other preferred DEXA locations include [place] (at the [facility]), [place], or [place].        Preventive Health Recommendations  Female Ages 40 to 49    Yearly exam:     See your health care provider every year in order to  1. Review health changes.   2. Discuss preventive care.    3. Review your medicines if your doctor prescribed any.      Get a Pap test every three years (unless you have an abnormal result and your provider advises testing more often).      If you get Pap tests with HPV test, you only need to test every 5 years, unless you have an abnormal result. You do not need a Pap test if your uterus was removed  (hysterectomy) and you have not had cancer.      You should be tested each year for STDs (sexually transmitted diseases), if you're at risk.     Ask your doctor if you should have a mammogram.      Have a colonoscopy (test for colon cancer) if someone in your family has had colon cancer or polyps before age 50.       Have a cholesterol test every 5 years.       Have a diabetes test (fasting glucose) after age 45. If you are at risk for diabetes, you should have this test every 3 years.    Shots: Get a flu shot each year. Get a tetanus shot every 10 years.     Nutrition:     Eat at least 5 servings of fruits and vegetables each day.    Eat whole-grain bread, whole-wheat pasta and brown rice instead of white grains and rice.    Get adequate Calcium and Vitamin D.      Lifestyle    Exercise at least 150 minutes a week (an average of 30 minutes a day, 5 days a week). This will help you control your weight and prevent disease.    Limit alcohol to one drink per day.    No smoking.     Wear sunscreen to prevent skin cancer.    See your dentist every six months for an exam and cleaning.

## 2021-03-01 ENCOUNTER — MYC MEDICAL ADVICE (OUTPATIENT)
Dept: FAMILY MEDICINE | Facility: CLINIC | Age: 51
End: 2021-03-01

## 2021-03-02 ENCOUNTER — MYC MEDICAL ADVICE (OUTPATIENT)
Dept: FAMILY MEDICINE | Facility: CLINIC | Age: 51
End: 2021-03-02

## 2021-03-02 ENCOUNTER — OFFICE VISIT (OUTPATIENT)
Dept: FAMILY MEDICINE | Facility: CLINIC | Age: 51
End: 2021-03-02
Payer: COMMERCIAL

## 2021-03-02 VITALS
BODY MASS INDEX: 35.04 KG/M2 | SYSTOLIC BLOOD PRESSURE: 105 MMHG | HEIGHT: 68 IN | TEMPERATURE: 97.9 F | RESPIRATION RATE: 15 BRPM | WEIGHT: 231.2 LBS | HEART RATE: 65 BPM | DIASTOLIC BLOOD PRESSURE: 71 MMHG | OXYGEN SATURATION: 99 %

## 2021-03-02 DIAGNOSIS — Z11.59 NEED FOR HEPATITIS C SCREENING TEST: ICD-10-CM

## 2021-03-02 DIAGNOSIS — Z13.228 SCREENING FOR ENDOCRINE, NUTRITIONAL, METABOLIC AND IMMUNITY DISORDER: ICD-10-CM

## 2021-03-02 DIAGNOSIS — Z12.31 ENCOUNTER FOR SCREENING MAMMOGRAM FOR BREAST CANCER: ICD-10-CM

## 2021-03-02 DIAGNOSIS — Z13.220 LIPID SCREENING: ICD-10-CM

## 2021-03-02 DIAGNOSIS — Z13.21 ENCOUNTER FOR VITAMIN DEFICIENCY SCREENING: Primary | ICD-10-CM

## 2021-03-02 DIAGNOSIS — Z12.4 SCREENING FOR CERVICAL CANCER: ICD-10-CM

## 2021-03-02 DIAGNOSIS — Z13.0 SCREENING FOR ENDOCRINE, NUTRITIONAL, METABOLIC AND IMMUNITY DISORDER: ICD-10-CM

## 2021-03-02 DIAGNOSIS — Z13.29 SCREENING FOR ENDOCRINE, NUTRITIONAL, METABOLIC AND IMMUNITY DISORDER: ICD-10-CM

## 2021-03-02 DIAGNOSIS — Z13.21 SCREENING FOR ENDOCRINE, NUTRITIONAL, METABOLIC AND IMMUNITY DISORDER: ICD-10-CM

## 2021-03-02 DIAGNOSIS — Z13.1 SCREENING FOR DIABETES MELLITUS: ICD-10-CM

## 2021-03-02 DIAGNOSIS — Z11.4 SCREENING FOR HIV (HUMAN IMMUNODEFICIENCY VIRUS): ICD-10-CM

## 2021-03-02 DIAGNOSIS — Z00.00 ROUTINE GENERAL MEDICAL EXAMINATION AT A HEALTH CARE FACILITY: Primary | ICD-10-CM

## 2021-03-02 DIAGNOSIS — Z90.710 S/P HYSTERECTOMY: ICD-10-CM

## 2021-03-02 DIAGNOSIS — R63.5 WEIGHT GAIN: ICD-10-CM

## 2021-03-02 DIAGNOSIS — Z23 NEED FOR VACCINATION: ICD-10-CM

## 2021-03-02 LAB
CHOLEST SERPL-MCNC: 186 MG/DL
FSH SERPL-ACNC: 114.1 IU/L
GLUCOSE SERPL-MCNC: 79 MG/DL (ref 70–99)
HCV AB SERPL QL IA: NONREACTIVE
HDLC SERPL-MCNC: 98 MG/DL
HIV 1+2 AB+HIV1 P24 AG SERPL QL IA: NONREACTIVE
LDLC SERPL CALC-MCNC: 77 MG/DL
NONHDLC SERPL-MCNC: 88 MG/DL
TRIGL SERPL-MCNC: 57 MG/DL
TSH SERPL DL<=0.005 MIU/L-ACNC: 1.25 MU/L (ref 0.4–4)

## 2021-03-02 PROCEDURE — 99213 OFFICE O/P EST LOW 20 MIN: CPT | Mod: 25 | Performed by: FAMILY MEDICINE

## 2021-03-02 PROCEDURE — 83001 ASSAY OF GONADOTROPIN (FSH): CPT | Performed by: FAMILY MEDICINE

## 2021-03-02 PROCEDURE — 87389 HIV-1 AG W/HIV-1&-2 AB AG IA: CPT | Performed by: FAMILY MEDICINE

## 2021-03-02 PROCEDURE — G0145 SCR C/V CYTO,THINLAYER,RESCR: HCPCS | Performed by: FAMILY MEDICINE

## 2021-03-02 PROCEDURE — 90471 IMMUNIZATION ADMIN: CPT | Performed by: FAMILY MEDICINE

## 2021-03-02 PROCEDURE — 82306 VITAMIN D 25 HYDROXY: CPT | Performed by: FAMILY MEDICINE

## 2021-03-02 PROCEDURE — 86803 HEPATITIS C AB TEST: CPT | Performed by: FAMILY MEDICINE

## 2021-03-02 PROCEDURE — 99396 PREV VISIT EST AGE 40-64: CPT | Mod: 25 | Performed by: FAMILY MEDICINE

## 2021-03-02 PROCEDURE — 82947 ASSAY GLUCOSE BLOOD QUANT: CPT | Performed by: FAMILY MEDICINE

## 2021-03-02 PROCEDURE — 36415 COLL VENOUS BLD VENIPUNCTURE: CPT | Performed by: FAMILY MEDICINE

## 2021-03-02 PROCEDURE — 84481 FREE ASSAY (FT-3): CPT | Performed by: FAMILY MEDICINE

## 2021-03-02 PROCEDURE — 90715 TDAP VACCINE 7 YRS/> IM: CPT | Performed by: FAMILY MEDICINE

## 2021-03-02 PROCEDURE — 87624 HPV HI-RISK TYP POOLED RSLT: CPT | Performed by: FAMILY MEDICINE

## 2021-03-02 PROCEDURE — 84443 ASSAY THYROID STIM HORMONE: CPT | Performed by: FAMILY MEDICINE

## 2021-03-02 PROCEDURE — 80061 LIPID PANEL: CPT | Performed by: FAMILY MEDICINE

## 2021-03-02 ASSESSMENT — MIFFLIN-ST. JEOR: SCORE: 1714.28

## 2021-03-02 NOTE — RESULT ENCOUNTER NOTE
Niko Sepulveda,  Nice to see you!  Your results all look good (so far).  Your FSH definitely suggests you are post-menopausal.  Being postmenopausal is likely contributing to your difficulty losing weight.  Your TSH (thyroid function test) is normal and your fasting blood sugar and lipid panel (cholesterol) results are great!    [place] does have a medical weight management clinic run by Endocrinology.  They do prescribe weight-loss medications there when it is appropriate.  Let me know if you are interested and I can refer you.       We're still waiting on the HIV and Hep C results.  Poonam Mix MD

## 2021-03-02 NOTE — NURSING NOTE
Prior to immunization administration, verified patients identity using patient s name and date of birth. Please see Immunization Activity for additional information.     Screening Questionnaire for Adult Immunization    Are you sick today?   No   Do you have allergies to medications, food, a vaccine component or latex?   No   Have you ever had a serious reaction after receiving a vaccination?   No   Do you have a long-term health problem with heart, lung, kidney, or metabolic disease (e.g., diabetes), asthma, a blood disorder, no spleen, complement component deficiency, a cochlear implant, or a spinal fluid leak?  Are you on long-term aspirin therapy?   No   Do you have cancer, leukemia, HIV/AIDS, or any other immune system problem?   No   Do you have a parent, brother, or sister with an immune system problem?   No   In the past 3 months, have you taken medications that affect  your immune system, such as prednisone, other steroids, or anticancer drugs; drugs for the treatment of rheumatoid arthritis, Crohn s disease, or psoriasis; or have you had radiation treatments?   No   Have you had a seizure, or a brain or other nervous system problem?   No   During the past year, have you received a transfusion of blood or blood    products, or been given immune (gamma) globulin or antiviral drug?   No   For women: Are you pregnant or is there a chance you could become       pregnant during the next month?   No   Have you received any vaccinations in the past 4 weeks?   No     Immunization questionnaire answers were all negative.        Per orders of Dr. Mix, injection of Tdap vaccines given by Fabio Buchanan MA. Patient instructed to remain in clinic for 15 minutes afterwards, and to report any adverse reaction to me immediately.       Screening performed by Fabio Buchanan MA on 3/2/2021 at 9:20 AM.  VIS for Tdap vaccines given on same date of administration.  Staff signature/Title: Fabio Buchanan MA on 3/2/2021  at 9:20 AM

## 2021-03-03 NOTE — TELEPHONE ENCOUNTER
Dr. Mix-Please review and advise if plan was to also check Vitamin D level?  Lab pended as add on.    Thank you!  TRINY WardN, RN  [facility]

## 2021-03-04 LAB
COPATH REPORT: NORMAL
DEPRECATED CALCIDIOL+CALCIFEROL SERPL-MC: 62 UG/L (ref 20–75)
PAP: NORMAL

## 2021-03-04 NOTE — TELEPHONE ENCOUNTER
Spoke with Elaine in lab and Vitamin D will be processed.    Writer responded via [facility].  TRINY WardN, RN  [facility]

## 2021-03-04 NOTE — TELEPHONE ENCOUNTER
My apologies.  I did place a Vit D add-on order a couple hours ago but its still not marked by lab if they can run it.  Please check with lab to see if she needs a re-draw.  Poonam Mix MD

## 2021-03-05 ENCOUNTER — MYC MEDICAL ADVICE (OUTPATIENT)
Dept: FAMILY MEDICINE | Facility: CLINIC | Age: 51
End: 2021-03-05
Payer: COMMERCIAL

## 2021-03-05 DIAGNOSIS — R63.5 WEIGHT GAIN: Primary | ICD-10-CM

## 2021-03-05 LAB
FINAL DIAGNOSIS: NORMAL
HPV HR 12 DNA CVX QL NAA+PROBE: NEGATIVE
HPV16 DNA SPEC QL NAA+PROBE: NEGATIVE
HPV18 DNA SPEC QL NAA+PROBE: NEGATIVE
SPECIMEN DESCRIPTION: NORMAL
SPECIMEN SOURCE CVX/VAG CYTO: NORMAL

## 2021-03-08 LAB — T3FREE SERPL-MCNC: 3 PG/ML (ref 2.3–4.2)

## 2021-03-08 NOTE — TELEPHONE ENCOUNTER
I had discussed with her that I don't usually do T3 or T4 if the TSH is normal.  Those are typically reflex tests, which is standard of care.  If she wants more detailed endocrinology tests I recommend she discuss the need for additional testing with Endocrinology.  I did ask the lab to add on T3 and T4 but I'm not sure if they will be able to do so at this time.    Poonam Mix MD

## 2021-03-09 LAB
T4 FREE SERPL-MCNC: NORMAL NG/DL (ref 0.76–1.46)
T4 FREE SERPL-MCNC: NORMAL NG/DL (ref 0.76–1.46)

## 2021-03-09 NOTE — RESULT ENCOUNTER NOTE
Hi Coco,  Your T3 level is normal as expected when the TSH is normal.  Unfortunately the lab was unable to add T4.  We do not typically check T3 and T4 levels when the TSH is normal.      Poonam Mix MD

## 2021-03-15 DIAGNOSIS — Z12.31 VISIT FOR SCREENING MAMMOGRAM: ICD-10-CM

## 2021-03-15 PROCEDURE — 77067 SCR MAMMO BI INCL CAD: CPT | Mod: GC

## 2021-03-15 PROCEDURE — 77063 BREAST TOMOSYNTHESIS BI: CPT | Mod: GC

## 2021-09-04 ENCOUNTER — HEALTH MAINTENANCE LETTER (OUTPATIENT)
Age: 51
End: 2021-09-04

## 2022-04-08 ENCOUNTER — ANCILLARY PROCEDURE (OUTPATIENT)
Dept: MAMMOGRAPHY | Facility: CLINIC | Age: 52
End: 2022-04-08
Attending: FAMILY MEDICINE
Payer: COMMERCIAL

## 2022-04-08 DIAGNOSIS — Z12.31 VISIT FOR SCREENING MAMMOGRAM: ICD-10-CM

## 2022-04-08 PROCEDURE — 77063 BREAST TOMOSYNTHESIS BI: CPT | Performed by: RADIOLOGY

## 2022-04-08 PROCEDURE — 77067 SCR MAMMO BI INCL CAD: CPT | Performed by: RADIOLOGY

## 2022-04-11 NOTE — RESULT ENCOUNTER NOTE
Niko Sepulveda,  I am happy to see that you completed your mammogram and that your result is normal!  Poonam Mix MD

## 2022-04-16 ENCOUNTER — HEALTH MAINTENANCE LETTER (OUTPATIENT)
Age: 52
End: 2022-04-16

## 2022-10-16 ENCOUNTER — HEALTH MAINTENANCE LETTER (OUTPATIENT)
Age: 52
End: 2022-10-16

## 2023-01-27 ENCOUNTER — MYC MEDICAL ADVICE (OUTPATIENT)
Dept: FAMILY MEDICINE | Facility: CLINIC | Age: 53
End: 2023-01-27
Payer: COMMERCIAL

## 2023-01-27 DIAGNOSIS — Z12.11 SPECIAL SCREENING FOR MALIGNANT NEOPLASMS, COLON: Primary | ICD-10-CM

## 2023-01-30 ENCOUNTER — LAB (OUTPATIENT)
Dept: FAMILY MEDICINE | Facility: CLINIC | Age: 53
End: 2023-01-30
Payer: COMMERCIAL

## 2023-01-30 DIAGNOSIS — Z12.11 SPECIAL SCREENING FOR MALIGNANT NEOPLASMS, COLON: ICD-10-CM

## 2023-01-30 NOTE — TELEPHONE ENCOUNTER
Responded to patient via [employer]    Pts last appt was March 2021    TRINY BassettN RN  [facility]

## 2023-01-30 NOTE — TELEPHONE ENCOUNTER
"Dr. Mix--    Pt asking for cologuard order, I advised scheduling annual visit where you can address preventative orders    Patient saying she \"has nothing due\"    Please advise.     TRINY BassettN RN  [facility]    "

## 2023-02-17 LAB — NONINV COLON CA DNA+OCC BLD SCRN STL QL: NEGATIVE

## 2023-02-19 NOTE — RESULT ENCOUNTER NOTE
Niko Sepulveda,  Thanks for completing the cologuard stool test for colon cancer screening!  Your stool test is negative for microscopic (hidden) blood.  This test should be repeated every 3 years.     Poonam Mix MD

## 2023-05-02 ENCOUNTER — ANCILLARY PROCEDURE (OUTPATIENT)
Dept: MAMMOGRAPHY | Facility: CLINIC | Age: 53
End: 2023-05-02
Attending: FAMILY MEDICINE
Payer: COMMERCIAL

## 2023-05-02 DIAGNOSIS — Z12.31 VISIT FOR SCREENING MAMMOGRAM: ICD-10-CM

## 2023-05-02 PROCEDURE — 77067 SCR MAMMO BI INCL CAD: CPT | Performed by: STUDENT IN AN ORGANIZED HEALTH CARE EDUCATION/TRAINING PROGRAM

## 2023-05-02 PROCEDURE — 77063 BREAST TOMOSYNTHESIS BI: CPT | Performed by: STUDENT IN AN ORGANIZED HEALTH CARE EDUCATION/TRAINING PROGRAM

## 2023-06-01 ENCOUNTER — HEALTH MAINTENANCE LETTER (OUTPATIENT)
Age: 53
End: 2023-06-01

## 2024-05-24 ENCOUNTER — ANCILLARY PROCEDURE (OUTPATIENT)
Dept: MAMMOGRAPHY | Facility: CLINIC | Age: 54
End: 2024-05-24
Attending: FAMILY MEDICINE
Payer: COMMERCIAL

## 2024-05-24 DIAGNOSIS — Z12.31 VISIT FOR SCREENING MAMMOGRAM: ICD-10-CM

## 2024-05-24 PROCEDURE — 77063 BREAST TOMOSYNTHESIS BI: CPT | Mod: GC | Performed by: STUDENT IN AN ORGANIZED HEALTH CARE EDUCATION/TRAINING PROGRAM

## 2024-05-24 PROCEDURE — 77067 SCR MAMMO BI INCL CAD: CPT | Mod: GC | Performed by: STUDENT IN AN ORGANIZED HEALTH CARE EDUCATION/TRAINING PROGRAM

## 2024-08-10 ENCOUNTER — HEALTH MAINTENANCE LETTER (OUTPATIENT)
Age: 54
End: 2024-08-10

## 2025-06-18 ENCOUNTER — ANCILLARY PROCEDURE (OUTPATIENT)
Dept: MAMMOGRAPHY | Facility: CLINIC | Age: 55
End: 2025-06-18
Attending: FAMILY MEDICINE
Payer: COMMERCIAL

## 2025-06-18 DIAGNOSIS — Z12.31 VISIT FOR SCREENING MAMMOGRAM: ICD-10-CM

## 2025-06-18 PROCEDURE — 77063 BREAST TOMOSYNTHESIS BI: CPT | Performed by: STUDENT IN AN ORGANIZED HEALTH CARE EDUCATION/TRAINING PROGRAM

## 2025-06-18 PROCEDURE — 77067 SCR MAMMO BI INCL CAD: CPT | Performed by: STUDENT IN AN ORGANIZED HEALTH CARE EDUCATION/TRAINING PROGRAM

## 2025-08-16 ENCOUNTER — HEALTH MAINTENANCE LETTER (OUTPATIENT)
Age: 55
End: 2025-08-16

## (undated) DEVICE — PREP SKIN SCRUB TRAY 4461A

## (undated) DEVICE — ESU GROUND PAD UNIVERSAL W/O CORD

## (undated) DEVICE — ENDO ACCESS PLATFORM GELPOINT SGL INCISION CNGL2

## (undated) DEVICE — TUBING C02 INSUFFLATION LAP FILTER HEATER 6198

## (undated) DEVICE — APPLICATOR COTTON TIP 6"X2 STERILE LF 6012

## (undated) DEVICE — JELLY LUBRICATING SURGILUBE 2OZ TUBE

## (undated) DEVICE — Device

## (undated) DEVICE — PAD CHUX UNDERPAD 30X30"

## (undated) DEVICE — BLADE KNIFE SURG 10 371110

## (undated) DEVICE — WIPES FOLEY CARE SURESTEP PROVON DFC100

## (undated) DEVICE — SU VICRYL 0 UR-5 27" J376H

## (undated) DEVICE — GLOVE PROTEXIS BLUE W/NEU-THERA 7.0  2D73EB70

## (undated) DEVICE — CATH TRAY FOLEY SURESTEP 16FR WDRAIN BAG STLK LATEX A300316A

## (undated) DEVICE — SOL WATER IRRIG 1000ML BOTTLE 2F7114

## (undated) DEVICE — ENDO TROCAR BLADED 11MM STD VERSAONE B11STF

## (undated) DEVICE — LINEN GOWN X4 5410

## (undated) DEVICE — SU VICRYL 0 TIE 54" J608H

## (undated) DEVICE — DECANTER TRANSFER DEVICE 2008S

## (undated) DEVICE — BLADE KNIFE SURG 15 371115

## (undated) DEVICE — ENDO POUCH GOLD 10MM ECATCH 173050G

## (undated) DEVICE — LINEN TOWEL PACK X5 5464

## (undated) DEVICE — SUCTION MANIFOLD DORNOCH ULTRA CART UL-CL500

## (undated) DEVICE — SU MONOCRYL 4-0 PS-2 18" UND Y496G

## (undated) DEVICE — LINEN POUCH DBL 5427

## (undated) DEVICE — GLOVE PROTEXIS BLUE W/NEU-THERA 6.5  2D73EB65

## (undated) DEVICE — GLOVE PROTEXIS W/NEU-THERA 6.5  2D73TE65

## (undated) DEVICE — DRAPE SLEEVE 599

## (undated) DEVICE — GOWN XLG DISP 9545

## (undated) DEVICE — TUBING SMOKE EVAC PNEUVIEW 9660-XE

## (undated) DEVICE — PAD PERI INDIV WRAP 11" 2022A

## (undated) DEVICE — COVER CAMERA IN-LIGHT DISP LT-C02

## (undated) DEVICE — SOL NACL 0.9% INJ 1000ML BAG 2B1324X

## (undated) DEVICE — SU SILK 3-0 TIE 12X30" A304H

## (undated) DEVICE — SUCTION IRR STRYKERFLOW II W/TIP 250-070-520

## (undated) DEVICE — SU VICRYL 4-0 PS-2 18" UND J496H

## (undated) DEVICE — ENDO SCOPE WARMER LF TM500

## (undated) DEVICE — SOL NACL 0.9% IRRIG 1000ML BOTTLE 2F7124

## (undated) DEVICE — KIT PATIENT POSITIONING PIGAZZI LATEX FREE 40580

## (undated) DEVICE — DRSG STERI STRIP 1/2X4" R1547

## (undated) DEVICE — PANTIES MESH LG/XLG 2PK 706M2

## (undated) DEVICE — BARRIER INTERCEED 3X4" 4350

## (undated) RX ORDER — FENTANYL CITRATE 50 UG/ML
INJECTION, SOLUTION INTRAMUSCULAR; INTRAVENOUS
Status: DISPENSED
Start: 2017-02-17

## (undated) RX ORDER — CEFAZOLIN SODIUM 2 G/100ML
INJECTION, SOLUTION INTRAVENOUS
Status: DISPENSED
Start: 2017-02-17